# Patient Record
Sex: MALE | Race: WHITE | NOT HISPANIC OR LATINO | Employment: OTHER | URBAN - METROPOLITAN AREA
[De-identification: names, ages, dates, MRNs, and addresses within clinical notes are randomized per-mention and may not be internally consistent; named-entity substitution may affect disease eponyms.]

---

## 2017-01-30 DIAGNOSIS — G47.00 INSOMNIA: ICD-10-CM

## 2017-01-31 RX ORDER — TRAZODONE HYDROCHLORIDE 50 MG/1
150 TABLET ORAL NIGHTLY
Qty: 90 TABLET | Refills: 0 | Status: SHIPPED | OUTPATIENT
Start: 2017-01-31 | End: 2017-03-06 | Stop reason: SDUPTHER

## 2017-03-06 DIAGNOSIS — G47.00 INSOMNIA: ICD-10-CM

## 2017-03-07 RX ORDER — TRAZODONE HYDROCHLORIDE 50 MG/1
150 TABLET ORAL NIGHTLY
Qty: 90 TABLET | Refills: 0 | Status: SHIPPED | OUTPATIENT
Start: 2017-03-07 | End: 2017-04-04 | Stop reason: SDUPTHER

## 2017-04-04 ENCOUNTER — OFFICE VISIT (OUTPATIENT)
Dept: INTERNAL MEDICINE | Age: 62
End: 2017-04-04

## 2017-04-04 VITALS
HEIGHT: 71 IN | HEART RATE: 95 BPM | WEIGHT: 149 LBS | BODY MASS INDEX: 20.86 KG/M2 | OXYGEN SATURATION: 98 % | SYSTOLIC BLOOD PRESSURE: 114 MMHG | DIASTOLIC BLOOD PRESSURE: 70 MMHG | TEMPERATURE: 97.9 F

## 2017-04-04 DIAGNOSIS — F51.04 CHRONIC INSOMNIA: Primary | Chronic | ICD-10-CM

## 2017-04-04 DIAGNOSIS — G47.00 INSOMNIA, UNSPECIFIED TYPE: ICD-10-CM

## 2017-04-04 PROCEDURE — 99212 OFFICE O/P EST SF 10 MIN: CPT | Performed by: INTERNAL MEDICINE

## 2017-04-04 RX ORDER — MONTELUKAST SODIUM 10 MG/1
10 TABLET ORAL NIGHTLY
COMMUNITY
Start: 2017-03-27 | End: 2019-12-09 | Stop reason: SDUPTHER

## 2017-04-04 RX ORDER — TRAZODONE HYDROCHLORIDE 150 MG/1
150 TABLET ORAL NIGHTLY
Qty: 30 TABLET | Refills: 6 | Status: SHIPPED | OUTPATIENT
Start: 2017-04-04 | End: 2017-10-30 | Stop reason: SDUPTHER

## 2017-04-04 NOTE — PROGRESS NOTES
"Raymundo ADRIAN Vanessa / 61 y.o. / male  04/04/2017    VITALS    /70  Pulse 95  Temp 97.9 °F (36.6 °C)  Ht 71\" (180.3 cm)  Wt 149 lb (67.6 kg)  SpO2 98%  BMI 20.78 kg/m2  BP Readings from Last 3 Encounters:   04/04/17 114/70   07/27/16 110/60   07/05/16 115/74     Wt Readings from Last 3 Encounters:   04/04/17 149 lb (67.6 kg)   07/27/16 151 lb (68.5 kg)   07/05/16 151 lb 3.2 oz (68.6 kg)      Body mass index is 20.78 kg/(m^2).    CC:  Main reason(s) for today's visit: Insomnia      HPI:     H/o chronic insomnia stable on trazodone 150 mg nightly. Denies depression, increased anxiety. No problems w/ medication.     ____________________________________________________________________    ASSESSMENT & PLAN:    Problem List Items Addressed This Visit        High    Chronic insomnia - Primary (Chronic)    Current Assessment & Plan     Stable. Continue trazodone. Change to 150 mg tablet qhs.            Other Visit Diagnoses     Insomnia, unspecified type        Relevant Medications    traZODone (DESYREL) 150 MG tablet        No orders of the defined types were placed in this encounter.      Summary/Discussion:     ·       Return in about 3 months (around 7/4/2017) for Annual physical.    No future appointments.    ____________________________________________________________________    REVIEW OF SYSTEMS    Review of Systems  As noted per HPI  Constitutional neg   Other: As noted per HPI      PHYSICAL EXAMINATION    Physical Exam  Constitutional  No distress   Psychiatric  Alert. Judgment and thought content normal. Mood normal      REVIEWED DATA:    Labs:   No results found for: NA, K, AST, ALT, BUN, CREATININE, EGFRIFNONA, EGFRIFAFRI    No results found for: GLU, HGBA1C    No results found for: LDL, HDL, TRIG, CHOLHDLRATIO    No results found for: TSH, FREET4       Lab Results   Component Value Date    WBC 4.89 06/02/2016    HGB 12.6 (L) 06/02/2016    HGB 12.6 (L) 09/25/2015     06/02/2016        Imaging: "        Medical Tests:        Summary of old records / correspondence / consultant report:        Request outside records:          ALLERGIES:    Other    MEDICATIONS:  Current Outpatient Prescriptions   Medication Sig Dispense Refill   • montelukast (SINGULAIR) 10 MG tablet Take 10 mg by mouth Every Night.     • traZODone (DESYREL) 50 MG tablet Take 3 tablet by mouth Every Night. 30 tablet 6     No current facility-administered medications for this visit.        [facility]    The following portions of the patient's history were reviewed and updated as appropriate: Allergies / Current Medications / Past Medical History / Surgical History / Social History / Family History    PROBLEM LIST:    Patient Active Problem List   Diagnosis   • Chronic insomnia   • Hearing loss   • Inguinal pain   • Left inguinal hernia   • Tension headache   • Tinnitus       PAST MEDICAL HX:    Past Medical History:   Diagnosis Date   • Asthma    • Hernia, inguinal     RIGHT ING HERNIA       PAST SURGICAL HX:    Past Surgical History:   Procedure Laterality Date   • HERNIA REPAIR      LEFT SIDE   • HERNIA REPAIR  06/2016   • INGUINAL HERNIA REPAIR Right 6/6/2016    Procedure: RT INGUINAL HERNIA REPAIR WITH MESH;  Surgeon: Greg Alatorre Jr., MD;  Location: [facility];  Service:    • VASECTOMY         SOCIAL HX:    Social History     Social History   • Marital status:      Spouse name: N/A   • Number of children: N/A   • Years of education: N/A     Occupational History   • TEACHER      Social History Main Topics   • Smoking status: Former Smoker     Packs/day: 1.00     Years: 10.00     Types: Cigarettes     Quit date: 2006   • Smokeless tobacco: Current User     Types: Chew   • Alcohol use No   • Drug use: No   • Sexual activity: Not Asked     Other Topics Concern   • None     Social History Narrative       FAMILY HX:    Family History   Problem Relation Age of Onset   • Cancer Father      [place]         **Richard Disclaimer:   Much of  this encounter note is an electronic transcription/translation of spoken language to printed text. The electronic translation of spoken language may permit erroneous, or at times, nonsensical words or phrases to be inadvertently transcribed. Although I have reviewed the note for such errors, some may still exist.

## 2017-05-16 ENCOUNTER — TELEPHONE (OUTPATIENT)
Dept: INTERNAL MEDICINE | Age: 62
End: 2017-05-16

## 2017-05-16 ENCOUNTER — OFFICE VISIT (OUTPATIENT)
Dept: INTERNAL MEDICINE | Age: 62
End: 2017-05-16

## 2017-05-16 VITALS
TEMPERATURE: 98.1 F | WEIGHT: 150 LBS | HEIGHT: 71 IN | HEART RATE: 94 BPM | DIASTOLIC BLOOD PRESSURE: 66 MMHG | OXYGEN SATURATION: 98 % | SYSTOLIC BLOOD PRESSURE: 118 MMHG | BODY MASS INDEX: 21 KG/M2
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
## 2017-05-16 PROCEDURE — 99213 OFFICE O/P EST LOW 20 MIN: CPT | Performed by: INTERNAL MEDICINE

## 2017-05-16 RX ORDER — DOXYCYCLINE HYCLATE 100 MG/1
100 TABLET, DELAYED RELEASE ORAL 2 TIMES DAILY
Qty: 20 TABLET | Refills: 0 | Status: SHIPPED | OUTPATIENT
Start: 2017-05-16 | End: 2017-05-16

## 2017-05-16 RX ORDER — DOXYCYCLINE 100 MG/1
100 TABLET ORAL 2 TIMES DAILY
Qty: 20 TABLET | Refills: 0 | Status: SHIPPED | OUTPATIENT
Start: 2017-05-16 | End: 2017-05-26

## 2017-07-05 DIAGNOSIS — Z00.00 PREVENTATIVE HEALTH CARE: Primary | ICD-10-CM

## 2017-07-07 LAB
25(OH)D3+25(OH)D2 SERPL-MCNC: 29.9 NG/ML (ref 30–100)
ALBUMIN SERPL-MCNC: 3.8 G/DL (ref 3.5–5.2)
ALBUMIN/GLOB SERPL: 2 G/DL
ALP SERPL-CCNC: 62 U/L (ref 39–117)
ALT SERPL-CCNC: 7 U/L (ref 1–41)
APPEARANCE UR: CLEAR
AST SERPL-CCNC: 11 U/L (ref 1–40)
BASOPHILS # BLD AUTO: 0.07 10*3/MM3 (ref 0–0.2)
BASOPHILS NFR BLD AUTO: 1.8 % (ref 0–1.5)
BILIRUB SERPL-MCNC: 0.4 MG/DL (ref 0.1–1.2)
BILIRUB UR QL STRIP: NEGATIVE
BUN SERPL-MCNC: 13 MG/DL (ref 8–23)
BUN/CREAT SERPL: 10.7 (ref 7–25)
CALCIUM SERPL-MCNC: 8.8 MG/DL (ref 8.6–10.5)
CHLORIDE SERPL-SCNC: 105 MMOL/L (ref 98–107)
CHOLEST SERPL-MCNC: 223 MG/DL (ref 0–200)
CHOLEST/HDLC SERPL: 4.46 {RATIO}
CO2 SERPL-SCNC: 28.4 MMOL/L (ref 22–29)
COLOR UR: YELLOW
CONV COMMENT: NORMAL
CREAT SERPL-MCNC: 1.21 MG/DL (ref 0.76–1.27)
EOSINOPHIL # BLD AUTO: 0.07 10*3/MM3 (ref 0–0.7)
EOSINOPHIL NFR BLD AUTO: 1.8 % (ref 0.3–6.2)
ERYTHROCYTE [DISTWIDTH] IN BLOOD BY AUTOMATED COUNT: 13.4 % (ref 11.5–14.5)
GLOBULIN SER CALC-MCNC: 1.9 GM/DL
GLUCOSE SERPL-MCNC: 100 MG/DL (ref 65–99)
GLUCOSE UR QL: NEGATIVE
HBA1C MFR BLD: 5.58 % (ref 4.8–5.6)
HCT VFR BLD AUTO: 40.3 % (ref 40.4–52.2)
HCV AB S/CO SERPL IA: <0.1 S/CO RATIO (ref 0–0.9)
HDLC SERPL-MCNC: 50 MG/DL (ref 40–60)
HGB BLD-MCNC: 13.4 G/DL (ref 13.7–17.6)
HGB UR QL STRIP: NEGATIVE
IMM GRANULOCYTES # BLD: 0 10*3/MM3 (ref 0–0.03)
IMM GRANULOCYTES NFR BLD: 0 % (ref 0–0.5)
KETONES UR QL STRIP: NEGATIVE
LDLC SERPL CALC-MCNC: 158 MG/DL (ref 0–100)
LEUKOCYTE ESTERASE UR QL STRIP: NEGATIVE
LYMPHOCYTES # BLD AUTO: 1.53 10*3/MM3 (ref 0.9–4.8)
LYMPHOCYTES NFR BLD AUTO: 38.8 % (ref 19.6–45.3)
MCH RBC QN AUTO: 30.5 PG (ref 27–32.7)
MCHC RBC AUTO-ENTMCNC: 33.3 G/DL (ref 32.6–36.4)
MCV RBC AUTO: 91.6 FL (ref 79.8–96.2)
MONOCYTES # BLD AUTO: 0.29 10*3/MM3 (ref 0.2–1.2)
MONOCYTES NFR BLD AUTO: 7.4 % (ref 5–12)
NEUTROPHILS # BLD AUTO: 1.98 10*3/MM3 (ref 1.9–8.1)
NEUTROPHILS NFR BLD AUTO: 50.2 % (ref 42.7–76)
NITRITE UR QL STRIP: NEGATIVE
PH UR STRIP: 6.5 [PH] (ref 5–8)
PLATELET # BLD AUTO: 203 10*3/MM3 (ref 140–500)
POTASSIUM SERPL-SCNC: 4.3 MMOL/L (ref 3.5–5.2)
PROT SERPL-MCNC: 5.7 G/DL (ref 6–8.5)
PROT UR QL STRIP: NEGATIVE
PSA SERPL-MCNC: 0.54 NG/ML (ref 0–4)
RBC # BLD AUTO: 4.4 10*6/MM3 (ref 4.6–6)
SODIUM SERPL-SCNC: 142 MMOL/L (ref 136–145)
SP GR UR: 1.02 (ref 1–1.03)
T4 FREE SERPL-MCNC: 1.19 NG/DL (ref 0.93–1.7)
TESTOST SERPL-MCNC: 697 NG/DL (ref 348–1197)
TRIGL SERPL-MCNC: 76 MG/DL (ref 0–150)
TSH SERPL DL<=0.005 MIU/L-ACNC: 1.49 MIU/ML (ref 0.27–4.2)
UROBILINOGEN UR STRIP-MCNC: NORMAL MG/DL
VLDLC SERPL CALC-MCNC: 15.2 MG/DL (ref 5–40)
WBC # BLD AUTO: 3.94 10*3/MM3 (ref 4.5–10.7)

## 2017-07-13 ENCOUNTER — OFFICE VISIT (OUTPATIENT)
Dept: INTERNAL MEDICINE | Age: 62
End: 2017-07-13

## 2017-07-13 VITALS
OXYGEN SATURATION: 100 % | TEMPERATURE: 97.9 F | BODY MASS INDEX: 21 KG/M2 | WEIGHT: 150 LBS | HEART RATE: 91 BPM | HEIGHT: 71 IN | DIASTOLIC BLOOD PRESSURE: 60 MMHG | SYSTOLIC BLOOD PRESSURE: 110 MMHG

## 2017-07-13 DIAGNOSIS — F17.229 CHEWING TOBACCO NICOTINE DEPENDENCE WITH NICOTINE-INDUCED DISORDER: Chronic | ICD-10-CM

## 2017-07-13 DIAGNOSIS — Z12.11 SCREENING FOR COLON CANCER: ICD-10-CM

## 2017-07-13 DIAGNOSIS — E78.00 PURE HYPERCHOLESTEROLEMIA: ICD-10-CM

## 2017-07-13 DIAGNOSIS — Z00.00 ENCOUNTER FOR ANNUAL HEALTH EXAMINATION: Primary | ICD-10-CM

## 2017-07-13 PROCEDURE — 99213 OFFICE O/P EST LOW 20 MIN: CPT | Performed by: INTERNAL MEDICINE

## 2017-07-13 PROCEDURE — 93000 ELECTROCARDIOGRAM COMPLETE: CPT | Performed by: INTERNAL MEDICINE

## 2017-07-13 PROCEDURE — 99396 PREV VISIT EST AGE 40-64: CPT | Performed by: INTERNAL MEDICINE

## 2017-07-13 RX ORDER — ASPIRIN 81 MG/1
81 TABLET ORAL DAILY
COMMUNITY
Start: 2017-07-13 | End: 2019-03-22

## 2017-07-13 RX ORDER — ATORVASTATIN CALCIUM 20 MG/1
20 TABLET, FILM COATED ORAL DAILY
Qty: 30 TABLET | Refills: 3 | Status: SHIPPED | OUTPATIENT
Start: 2017-07-13 | End: 2019-03-22

## 2017-07-13 NOTE — ASSESSMENT & PLAN NOTE
Start atorvastatin 20 mg daily along w/ ECASA 81 mg qd. Maintain low fat/cholesterol diet.  Schedule for fasting labs in 3 months.

## 2017-07-13 NOTE — PROGRESS NOTES
Raymundo Mendezen / 62 y.o. / male  07/13/2017    CC: Main reason(s) for today's visit: Annual Exam      HPI:      Raymundo presents for annual health maintenance visit.  Uses chewing tobacco. Prior smoking hx.  Family h/o stroke, heart attack.  Personal h/o hyperlipidemia but never took medication.    · Last health maintenance visit: 1 year ago  · General health: good  · Lifestyle:  · Attempting to lose weight?: No   · Diet: could be better  · Exercise: could be better  · Tobacco: Uses smokeless nicotine   · Alcohol: recovering alocholic  · Work: Full-time  · Reproductive health:  · Sexually active?: Yes   · Concern for STD?: No   · Sexual problems?: No problems   · Sees Urologist?:No   · Depression Screening:      PHQ-9 Depression Screening 7/13/2017   Little interest or pleasure in doing things 0   Feeling down, depressed, or hopeless 0   PHQ-9 Total Score 0         PHQ-2: 0 (Not depressed)     PHQ-9:     Patient Care Team:  Alin Villegas MD as PCP - General  Alin Villegas MD as PCP - Family Medicine  Waqas Velarde MD as Consulting Physician (Allergy)  ______________________________________________________________________    No Known Allergies    Current Outpatient Prescriptions   Medication Sig Dispense Refill   • montelukast (SINGULAIR) 10 MG tablet Take 10 mg by mouth Every Night.     • traZODone (DESYREL) 150 MG tablet Take 1 tablet by mouth Every Night. 30 tablet 6       PFSH:     The following portions of the patient's history were reviewed and updated as appropriate: Allergies / Current Medications / Past Medical History / Surgical History / Social History / Family History    Patient Active Problem List   Diagnosis   • Chronic insomnia   • Tinnitus   • Pure hypercholesterolemia   • Chewing tobacco nicotine dependence with nicotine-induced disorder       Past Medical History:   Diagnosis Date   • Asthma    • History of skin cancer     left anterior chest   • Shingles 20's       Past Surgical History:   Procedure  Laterality Date   • HERNIA REPAIR      LEFT SIDE   • HERNIA REPAIR  2016   • INGUINAL HERNIA REPAIR Right 2016    Procedure: RT INGUINAL HERNIA REPAIR WITH MESH;  Surgeon: Greg Alatorre Jr., MD;  Location: [facility] OR;  Service:    • VASECTOMY         Social History     Social History   • Marital status:      Spouse name: Brianna*   • Number of children: 3   • Years of education: N/A     Occupational History   • 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)      Social History Main Topics   • Smoking status: Former Smoker     Packs/day: 1.00     Years: 10.00     Types: Cigarettes     Quit date:    • Smokeless tobacco: Current User     Types: Snuff      Comment: advised to quit smokeless tobacco   • Alcohol use No      Comment: 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 ~   • Drug use: No   • Sexual activity: Yes     Partners: Female     Other Topics Concern   • None     Social History Narrative       Family History   Problem Relation Age of Onset   • Alzheimer's disease Father 80      90   • Lymphoma Father    • Other Mother      borderline   • Stroke Brother    • Hypertension Brother    • Diabetes Maternal Grandmother    • Heart attack Maternal Grandfather 60   • Cancer Paternal Grandmother      another cancer   • Breast cancer Paternal Grandmother    • Cancer Paternal Grandfather        Immunization History   Administered Date(s) Administered   • Tdap 2014       ______________________________________________________________________    REVIEW OF SYSTEMS    Review of Systems   Constitutional: Negative.    HENT: Negative.    Eyes: Negative.    Respiratory: Negative.    Cardiovascular: Negative.    Gastrointestinal: Negative.    Endocrine: Negative.    Genitourinary: Negative.    Musculoskeletal: Negative.    Skin: Negative.    Allergic/Immunologic: Negative.    Neurological: Negative.    Hematological: Negative.    Psychiatric/Behavioral: Positive for sleep disturbance.       VITALS:    /60  Pulse  "91  Temp 97.9 °F (36.6 °C)  Ht 71\" (180.3 cm)  Wt 150 lb (68 kg)  SpO2 100%  BMI 20.92 kg/m2  BP Readings from Last 3 Encounters:   07/13/17 110/60   05/16/17 118/66   04/04/17 114/70     Wt Readings from Last 3 Encounters:   07/13/17 150 lb (68 kg)   05/16/17 150 lb (68 kg)   04/04/17 149 lb (67.6 kg)      Body mass index is 20.92 kg/(m^2).    PHYSICAL EXAMINATION    Physical Exam   Constitutional: He is oriented to person, place, and time. He appears well-nourished. No distress.   HENT:   Head: Normocephalic.   Right Ear: External ear normal.   Left Ear: External ear normal.   Nose: Nose normal.   Mouth/Throat: Oropharynx is clear and moist.   Eyes: Conjunctivae and EOM are normal. Pupils are equal, round, and reactive to light. No scleral icterus.   Neck: Normal range of motion. Neck supple. No tracheal deviation present. No thyromegaly present.   Cardiovascular: Normal rate, regular rhythm, normal heart sounds and intact distal pulses.  Exam reveals no gallop and no friction rub.    No murmur heard.  Pulmonary/Chest: Effort normal and breath sounds normal.   Abdominal: Soft. Bowel sounds are normal. He exhibits no distension and no mass. There is no tenderness. No hernia.   Genitourinary: Rectum normal, prostate normal, testes normal and penis normal.   Musculoskeletal: Normal range of motion. He exhibits no edema or deformity.   Lymphadenopathy:     He has no cervical adenopathy.     He has no axillary adenopathy.        Right: No inguinal and no supraclavicular adenopathy present.        Left: No inguinal and no supraclavicular adenopathy present.   Neurological: He is alert and oriented to person, place, and time. He has normal reflexes. He displays normal reflexes. No cranial nerve deficit. He exhibits normal muscle tone. Coordination normal.   Skin: Skin is warm and intact. No rash noted. He is not diaphoretic. No cyanosis or erythema. No pallor. Nails show no clubbing.   Psychiatric: He has a normal " mood and affect. His behavior is normal. Judgment and thought content normal. Cognition and memory are normal.       REVIEWED DATA:    Lab Results   Component Value Date     07/06/2017    K 4.3 07/06/2017    AST 11 07/06/2017    ALT 7 07/06/2017    BUN 13 07/06/2017    CREATININE 1.21 07/06/2017    EGFRIFNONA 61 07/06/2017    EGFRIFAFRI 74 07/06/2017       Lab Results   Component Value Date     (H) 07/06/2017    HGBA1C 5.58 07/06/2017    TSH 1.490 07/06/2017    FREET4 1.19 07/06/2017       Lab Results   Component Value Date    PSA 0.544 07/06/2017    TESTOSTEROTT 697 07/06/2017       Lab Results   Component Value Date     (H) 07/06/2017    HDL 50 07/06/2017    TRIG 76 07/06/2017    CHOLHDLRATIO 4.46 07/06/2017       No components found for: [id number]    Lab Results   Component Value Date    WBC 3.94 (L) 07/06/2017    HGB 13.4 (L) 07/06/2017    MCV 91.6 07/06/2017     07/06/2017       Lab Results   Component Value Date    PROTEIN Negative 07/06/2017    GLUCOSEU Negative 07/06/2017    BLOODU Negative 07/06/2017    NITRITEU Negative 07/06/2017    LEUKOCYTESUR Negative 07/06/2017          Lab Results   Component Value Date    HEPCVIRUSABY <0.1 07/06/2017       ______________________________________________________________________    ASSESSMENT & PLAN    ANNUAL WELLNESS EXAM / PHYSICAL     Other medical problems addressed today:  Problem List Items Addressed This Visit     Pure hypercholesterolemia (Chronic)    Current Assessment & Plan     Start atorvastatin 20 mg daily along w/ ECASA 81 mg qd. Maintain low fat/cholesterol diet.  Schedule for fasting labs in 3 months.          Relevant Medications    aspirin 81 MG EC tablet    atorvastatin (LIPITOR) 20 MG tablet    Other Relevant Orders    Hepatic Function Panel    Lipid Panel With / Chol / HDL Ratio    Chewing tobacco nicotine dependence with nicotine-induced disorder (Chronic)    Current Assessment & Plan     Counseled on cessation. Try  nicotine gum.   Recommended seeing dentist regularly for exam.         Relevant Medications    traZODone (DESYREL) 150 MG tablet      Other Visit Diagnoses     Encounter for annual health examination    -  Primary    Screening for colon cancer        Relevant Orders    Cologuard          Summary/Discussion:     · Primary reason for today's visit was for annual health examination. However above active medical issues also needed to be addressed today.        Return in about 6 months (around 1/13/2018) for F/U chronic medical problems, Hyperlipidemia.    No future appointments.      HEALTHCARE MAINTENANCE ISSUES:    Cancer Screening:  · Colon: Initial/Next screening at age: PATIENT DEFERS AT THIS TIME (ordered Cologuard)  · Repeat colonoscopy N/A at this time  · Prostate: Performed today and recommended annual screening  · Testicular: Recommended monthly self exam  · Skin: Monthly self skin examination, annual exam by health professional  · Lung:   · Other: Oral cancer screening w/ dentist annually    Screening Labs & Tests:  · Lab results reviewed & discussed with with patient or orders placed today.  EKG:EKG Performed today and reviewed results with patient.  · Ekg: normal EKG, normal sinus rhythm, there are no previous tracings available for comparison   · Vascular Screening:   · DEXA (75+ or risk factors):   · HEP C (If born [id number] or risk factors): Negative screen    Immunization/Vaccinations (to be given today unless deferred by patient)  · Tetanus/Pertussis: Up to date  · Pneumovax: Not needed at this time  · Prevnar 13: Not needed at this time  · Zostavax: Recommended at outside pharmacy  · Influenza: Recommended annual flu shot  · Other:     Lifestyle Counseling:  · Lifestyle Modifications: Follow a low fat, low cholesterol diet and quit chewing tobacco  · Safety Issues: Always wear seatbelt, Avoid texting while driving   · Use sunscreen, regular skin examination  · Recommended annual dental/vision  examination.  · Emotional/Stress/Sleep: Reviewed and  given when appropriate      Health Maintenance   Topic Date Due   • INFLUENZA VACCINE  08/01/2017   • PROSTATE CANCER SCREENING  07/06/2018   • LIPID PANEL  07/06/2018   • TDAP/TD VACCINES (2 - Td) 06/23/2024   • COLONOSCOPY  07/13/2027   • HEPATITIS C SCREENING  Completed   • ZOSTER VACCINE  Addressed         **Richard Disclaimer:   Much of this encounter note is an electronic transcription/translation of spoken language to printed text. The electronic translation of spoken language may permit erroneous, or at times, nonsensical words or phrases to be inadvertently transcribed. Although I have reviewed the note for such errors, some may still exist.

## 2017-10-11 ENCOUNTER — RESULTS ENCOUNTER (OUTPATIENT)
Dept: INTERNAL MEDICINE | Age: 62
End: 2017-10-11

## 2017-10-11 DIAGNOSIS — E78.00 PURE HYPERCHOLESTEROLEMIA: ICD-10-CM

## 2017-10-30 DIAGNOSIS — G47.00 INSOMNIA, UNSPECIFIED TYPE: ICD-10-CM

## 2017-10-31 RX ORDER — TRAZODONE HYDROCHLORIDE 150 MG/1
TABLET ORAL
Qty: 30 TABLET | Refills: 3 | Status: SHIPPED | OUTPATIENT
Start: 2017-10-31 | End: 2018-02-28 | Stop reason: SDUPTHER

## 2018-02-06 ENCOUNTER — OFFICE VISIT (OUTPATIENT)
Dept: INTERNAL MEDICINE | Age: 63
End: 2018-02-06

## 2018-02-06 VITALS
TEMPERATURE: 97.7 F | HEART RATE: 89 BPM | DIASTOLIC BLOOD PRESSURE: 70 MMHG | HEIGHT: 71 IN | WEIGHT: 149.2 LBS | OXYGEN SATURATION: 100 % | BODY MASS INDEX: 20.89 KG/M2 | SYSTOLIC BLOOD PRESSURE: 118 MMHG

## 2018-02-06 DIAGNOSIS — R19.7 ACUTE DIARRHEA: Primary | ICD-10-CM

## 2018-02-06 DIAGNOSIS — Z12.11 SCREENING FOR COLON CANCER: ICD-10-CM

## 2018-02-06 DIAGNOSIS — K52.9 ACUTE GASTROENTERITIS: ICD-10-CM

## 2018-02-06 LAB
ALBUMIN SERPL-MCNC: 4.2 G/DL (ref 3.5–5.2)
ALBUMIN/GLOB SERPL: 2.1 G/DL
ALP SERPL-CCNC: 67 U/L (ref 39–117)
ALT SERPL-CCNC: 12 U/L (ref 1–41)
APPEARANCE UR: CLEAR
AST SERPL-CCNC: 13 U/L (ref 1–40)
BASOPHILS # BLD AUTO: 0.04 10*3/MM3 (ref 0–0.2)
BASOPHILS NFR BLD AUTO: 1 % (ref 0–1.5)
BILIRUB SERPL-MCNC: 0.2 MG/DL (ref 0.1–1.2)
BILIRUB UR QL STRIP: NEGATIVE
BUN SERPL-MCNC: 14 MG/DL (ref 8–23)
BUN/CREAT SERPL: 14.4 (ref 7–25)
CALCIUM SERPL-MCNC: 8.8 MG/DL (ref 8.6–10.5)
CHLORIDE SERPL-SCNC: 102 MMOL/L (ref 98–107)
CO2 SERPL-SCNC: 28 MMOL/L (ref 22–29)
COLOR UR: YELLOW
CREAT SERPL-MCNC: 0.97 MG/DL (ref 0.76–1.27)
EOSINOPHIL # BLD AUTO: 0.04 10*3/MM3 (ref 0–0.7)
EOSINOPHIL NFR BLD AUTO: 1 % (ref 0.3–6.2)
ERYTHROCYTE [DISTWIDTH] IN BLOOD BY AUTOMATED COUNT: 13.4 % (ref 11.5–14.5)
GFR SERPLBLD CREATININE-BSD FMLA CKD-EPI: 79 ML/MIN/1.73
GFR SERPLBLD CREATININE-BSD FMLA CKD-EPI: 95 ML/MIN/1.73
GLOBULIN SER CALC-MCNC: 2 GM/DL
GLUCOSE SERPL-MCNC: 76 MG/DL (ref 65–99)
GLUCOSE UR QL: NEGATIVE
HCT VFR BLD AUTO: 41.6 % (ref 40.4–52.2)
HGB BLD-MCNC: 13.4 G/DL (ref 13.7–17.6)
HGB UR QL STRIP: NEGATIVE
IMM GRANULOCYTES # BLD: 0 10*3/MM3 (ref 0–0.03)
IMM GRANULOCYTES NFR BLD: 0 % (ref 0–0.5)
KETONES UR QL STRIP: NEGATIVE
LEUKOCYTE ESTERASE UR QL STRIP: NEGATIVE
LYMPHOCYTES # BLD AUTO: 1.45 10*3/MM3 (ref 0.9–4.8)
LYMPHOCYTES NFR BLD AUTO: 35.8 % (ref 19.6–45.3)
MCH RBC QN AUTO: 30.6 PG (ref 27–32.7)
MCHC RBC AUTO-ENTMCNC: 32.2 G/DL (ref 32.6–36.4)
MCV RBC AUTO: 95 FL (ref 79.8–96.2)
MONOCYTES # BLD AUTO: 0.34 10*3/MM3 (ref 0.2–1.2)
MONOCYTES NFR BLD AUTO: 8.4 % (ref 5–12)
NEUTROPHILS # BLD AUTO: 2.18 10*3/MM3 (ref 1.9–8.1)
NEUTROPHILS NFR BLD AUTO: 53.8 % (ref 42.7–76)
NITRITE UR QL STRIP: NEGATIVE
PH UR STRIP: 5.5 [PH] (ref 5–8)
PLATELET # BLD AUTO: 190 10*3/MM3 (ref 140–500)
POTASSIUM SERPL-SCNC: 4.4 MMOL/L (ref 3.5–5.2)
PROT SERPL-MCNC: 6.2 G/DL (ref 6–8.5)
PROT UR QL STRIP: NEGATIVE
RBC # BLD AUTO: 4.38 10*6/MM3 (ref 4.6–6)
SODIUM SERPL-SCNC: 140 MMOL/L (ref 136–145)
SP GR UR: 1.02 (ref 1–1.03)
UROBILINOGEN UR STRIP-MCNC: NORMAL MG/DL
WBC # BLD AUTO: 4.05 10*3/MM3 (ref 4.5–10.7)

## 2018-02-06 PROCEDURE — 99213 OFFICE O/P EST LOW 20 MIN: CPT | Performed by: NURSE PRACTITIONER

## 2018-02-06 NOTE — PROGRESS NOTES
Subjective   Raymundo Mendez is a 62 y.o. male.     Diarrhea    This is a new problem. Episode onset: 4-5 days ago. Episode frequency: 2-3 times on Thursday, improved since then. The problem has been gradually improving. The stool consistency is described as watery. Associated symptoms include abdominal pain (cramping, still having but improved, generalized). Pertinent negatives include no arthralgias, chills, coughing, fever, myalgias or vomiting. Nothing aggravates the symptoms. Risk factors include ill contacts (works as ). Treatments tried: Pepto-Bismol. The treatment provided moderate relief. There is no history of bowel resection, inflammatory bowel disease, irritable bowel syndrome, malabsorption, a recent abdominal surgery or short gut syndrome.      Patient reports diarrhea was normal in color at onset, started taking Pepto Bismol and has noticed black stools since then. Colonoscopy is not UTD, Cologuard ordered at last visit but patient never received.     The following portions of the patient's history were reviewed and updated as appropriate: allergies, current medications, past family history, past medical history, past social history, past surgical history and problem list.    Review of Systems   Constitutional: Positive for fatigue. Negative for chills and fever.   HENT: Negative for congestion.    Respiratory: Negative for cough.    Cardiovascular: Negative for chest pain.   Gastrointestinal: Positive for abdominal pain (cramping, still having but improved, generalized) and diarrhea. Negative for abdominal distention, blood in stool, constipation, nausea, rectal pain and vomiting.   Genitourinary: Negative for dysuria, flank pain and hematuria.   Musculoskeletal: Negative for arthralgias and myalgias.       Objective   Physical Exam   Constitutional: Vital signs are normal. He appears well-developed and well-nourished. He is cooperative. He does not appear ill. No distress.    Cardiovascular: Normal rate, regular rhythm, S1 normal, S2 normal and normal heart sounds.    No murmur heard.  Pulmonary/Chest: Effort normal and breath sounds normal.   Abdominal: Soft. Normal appearance. He exhibits no distension and no mass. Bowel sounds are increased. There is no hepatosplenomegaly. There is no tenderness. There is no rebound, no CVA tenderness, no tenderness at McBurney's point and negative Collier's sign.   Neurological: He is alert. No cranial nerve deficit or sensory deficit.   Skin: Skin is warm, dry and intact.   Psychiatric: He has a normal mood and affect. His speech is normal and behavior is normal. Judgment and thought content normal. Cognition and memory are normal.   Nursing note and vitals reviewed.      Assessment/Plan   Problems Addressed this Visit     None      Visit Diagnoses     Acute diarrhea    -  Primary    Relevant Orders    CBC & Differential    Comprehensive Metabolic Panel    Cologuard - Stool, Per Rectum    Urinalysis With / Microscopic If Indicated - Urine, Clean Catch    Acute gastroenteritis        Relevant Orders    CBC & Differential    Comprehensive Metabolic Panel    Screening for colon cancer        Relevant Orders    Cologuard - Stool, Per Rectum        1. Acute diarrhea  Patient with acute diarrhea and abdominal cramping times approximately 4-5 days, significant improvement in the last couple of days.  I suspect this is likely an acute gastroenteritis, will obtain labs today to ensure no elevated white count, significant bleeding, or electrolyte abnormalities related to excessive water loss or diarrhea.  Discussed with patient I would continue conservative treatment, including bland diet and increase by mouth fluids until feeling better.  He does not have any abdominal pain today, do not suspect acute abdominal process.  Black stools noted by patient are most likely cause from Pepto-Bismol that he has taken, will check CBC today to ensure no significant  bleeding.  Patient is not up-to-date on colon cancer screening, will reorder Cologuard testing today. Follow up if no improvement in symptoms or if diarrhea returns.     - CBC & Differential  - Comprehensive Metabolic Panel  - Cologuard - Stool, Per Rectum  - Urinalysis With / Microscopic If Indicated - Urine, Clean Catch    2. Acute gastroenteritis    - CBC & Differential  - Comprehensive Metabolic Panel    3. Screening for colon cancer    - Cologuard - Stool, Per Rectum

## 2018-02-06 NOTE — PATIENT INSTRUCTIONS
Diarrhea, Adult    Diarrhea is frequent loose and watery bowel movements. Diarrhea can make you feel weak and cause you to become dehydrated. Dehydration can make you tired and thirsty, cause you to have a dry mouth, and decrease how often you urinate. Diarrhea typically lasts 2-3 days. However, it can last longer if it is a sign of something more serious. It is important to treat your diarrhea as told by your health care provider.  Follow these instructions at home:  Eating and drinking  Follow these recommendations as told by your health care provider:  · Take an oral rehydration solution (ORS). This is a drink that is sold at pharmacies and retail stores.  · Drink clear fluids, such as water, ice chips, diluted fruit juice, and low-calorie sports drinks.  · Eat bland, easy-to-digest foods in small amounts as you are able. These foods include bananas, applesauce, rice, lean meats, toast, and crackers.  · Avoid drinking fluids that contain a lot of sugar or caffeine, such as energy drinks, sports drinks, and soda.  · Avoid alcohol.  · Avoid spicy or fatty foods.  General instructions  · Drink enough fluid to keep your urine clear or pale yellow.  · Wash your hands often. If soap and water are not available, use hand .  · Make sure that all people in your household wash their hands well and often.  · Take over-the-counter and prescription medicines only as told by your health care provider.  · Rest at home while you recover.  · Watch your condition for any changes.  · Take a warm bath to relieve any burning or pain from frequent diarrhea episodes.  · Keep all follow-up visits as told by your health care provider. This is important.  Contact a health care provider if:  · You have a fever.  · Your diarrhea gets worse.  · You have new symptoms.  · You cannot keep fluids down.  · You feel light-headed or dizzy.  · You have a headache  · You have muscle cramps.  Get help right away if:  · You have chest  pain.  · You feel extremely weak or you faint.  · You have bloody or black stools or stools that look like tar.  · You have severe pain, cramping, or bloating in your abdomen.  · You have trouble breathing or you are breathing very quickly.  · Your heart is beating very quickly.  · Your skin feels cold and clammy.  · You feel confused.  · You have signs of dehydration, such as:  ¨ Dark urine, very little urine, or no urine.  ¨ Cracked lips.  ¨ Dry mouth.  ¨ Sunken eyes.  ¨ Sleepiness.  ¨ Weakness.  This information is not intended to replace advice given to you by your health care provider. Make sure you discuss any questions you have with your health care provider.  Document Released: 12/08/2003 Document Revised: 04/27/2017 Document Reviewed: 08/23/2016  [employer] Interactive Patient Education © 2017 [employer]

## 2018-02-07 ENCOUNTER — TELEPHONE (OUTPATIENT)
Dept: INTERNAL MEDICINE | Age: 63
End: 2018-02-07

## 2018-02-08 ENCOUNTER — TELEPHONE (OUTPATIENT)
Dept: INTERNAL MEDICINE | Age: 63
End: 2018-02-08

## 2018-02-08 NOTE — TELEPHONE ENCOUNTER
----- Message from JULIO C Phipps sent at 2/7/2018  7:31 AM EST -----  Please call patient with results and send result letter to home address.    Mr. Mendez:     Here are the results of your labs from your visit.  Your CBC is non-remarkable, no sign of anemia, platelet disorder, or obvious infection. WBC count and hemoglobin are stable.   Your CMP labs are all normal. This is a measure of kidney function, electrolytes, and liver function. Fasting blood sugar is at an acceptable level.   Your urinalysis is normal, no sign of infection, blood, or protein in the urine.  No significant dehydration noted.  These values are most consistent with acute gastroenteritis, as we discussed. Continue conservative treatment as we discussed and follow up as needed if no improvement.     Neyda BUNCH

## 2018-02-08 NOTE — TELEPHONE ENCOUNTER
Attempted to call pt re: lab results.    Pt's phone does not have VM set up.    Will attempt to call pt at a later time.    KD

## 2018-02-08 NOTE — TELEPHONE ENCOUNTER
Pt returned call.    Pt was informed of all normal lab results.    Pt was advised to continue conservative Tx, and f/u PRN.    Pt demonstrated understanding.    KD

## 2018-02-28 DIAGNOSIS — G47.00 INSOMNIA, UNSPECIFIED TYPE: ICD-10-CM

## 2018-02-28 RX ORDER — TRAZODONE HYDROCHLORIDE 150 MG/1
TABLET ORAL
Qty: 30 TABLET | Refills: 1 | Status: SHIPPED | OUTPATIENT
Start: 2018-02-28 | End: 2018-04-24 | Stop reason: SDUPTHER

## 2018-04-24 DIAGNOSIS — G47.00 INSOMNIA, UNSPECIFIED TYPE: ICD-10-CM

## 2018-04-24 RX ORDER — TRAZODONE HYDROCHLORIDE 150 MG/1
TABLET ORAL
Qty: 30 TABLET | Refills: 0 | Status: SHIPPED | OUTPATIENT
Start: 2018-04-24 | End: 2018-05-30 | Stop reason: SDUPTHER

## 2018-05-30 DIAGNOSIS — G47.00 INSOMNIA, UNSPECIFIED TYPE: ICD-10-CM

## 2018-05-31 RX ORDER — TRAZODONE HYDROCHLORIDE 150 MG/1
TABLET ORAL
Qty: 30 TABLET | Refills: 0 | Status: SHIPPED | OUTPATIENT
Start: 2018-05-31 | End: 2018-06-28 | Stop reason: SDUPTHER

## 2018-06-28 DIAGNOSIS — G47.00 INSOMNIA, UNSPECIFIED TYPE: ICD-10-CM

## 2018-06-28 RX ORDER — TRAZODONE HYDROCHLORIDE 150 MG/1
TABLET ORAL
Qty: 30 TABLET | Refills: 0 | Status: SHIPPED | OUTPATIENT
Start: 2018-06-28 | End: 2018-07-27 | Stop reason: SDUPTHER

## 2018-07-27 DIAGNOSIS — G47.00 INSOMNIA, UNSPECIFIED TYPE: ICD-10-CM

## 2018-07-27 RX ORDER — TRAZODONE HYDROCHLORIDE 150 MG/1
TABLET ORAL
Qty: 30 TABLET | Refills: 0 | Status: SHIPPED | OUTPATIENT
Start: 2018-07-27 | End: 2018-08-06 | Stop reason: SDUPTHER

## 2018-08-06 ENCOUNTER — OFFICE VISIT (OUTPATIENT)
Dept: INTERNAL MEDICINE | Age: 63
End: 2018-08-06

## 2018-08-06 VITALS
HEART RATE: 88 BPM | TEMPERATURE: 99.1 F | OXYGEN SATURATION: 98 % | HEIGHT: 71 IN | WEIGHT: 151 LBS | DIASTOLIC BLOOD PRESSURE: 66 MMHG | SYSTOLIC BLOOD PRESSURE: 120 MMHG | BODY MASS INDEX: 21.14 KG/M2

## 2018-08-06 DIAGNOSIS — G47.00 INSOMNIA, UNSPECIFIED TYPE: Primary | ICD-10-CM

## 2018-08-06 PROCEDURE — 99212 OFFICE O/P EST SF 10 MIN: CPT | Performed by: INTERNAL MEDICINE

## 2018-08-06 RX ORDER — TRAZODONE HYDROCHLORIDE 150 MG/1
150 TABLET ORAL
Qty: 30 TABLET | Refills: 5 | Status: SHIPPED | OUTPATIENT
Start: 2018-08-06 | End: 2019-02-20 | Stop reason: SDUPTHER

## 2018-08-06 NOTE — PROGRESS NOTES
"[facility] [place] INTERNAL MEDICINE  WALDEMAR PADRON M.D.      Raymundo Mendez / 63 y.o. / male  08/06/2018      ASSESSMENT & PLAN:    Problem List Items Addressed This Visit        High    Insomnia - Primary (Chronic)    Overview     Stable. Continue trazodone 150 mg qHS.          Current Assessment & Plan     Refills sent to pharmacy.          Relevant Medications    traZODone (DESYREL) 150 MG tablet        No orders of the defined types were placed in this encounter.    New Medications Ordered This Visit   Medications   • traZODone (DESYREL) 150 MG tablet     Sig: Take 1 tablet by mouth every night at bedtime.     Dispense:  30 tablet     Refill:  5     PT NEEDS APPT FOR FURTHER REFILLS       Summary/Discussion:      No Follow-up on file.    ____________________________________________________________________    MEDICATIONS  Current Outpatient Prescriptions   Medication Sig Dispense Refill   • montelukast (SINGULAIR) 10 MG tablet Take 10 mg by mouth Every Night.     • traZODone (DESYREL) 150 MG tablet Take 1 tablet by mouth every night at bedtime. 30 tablet 5   • aspirin 81 MG EC tablet Take 1 tablet by mouth Daily.     • atorvastatin (LIPITOR) 20 MG tablet Take 1 tablet by mouth Daily. 30 tablet 3     No current facility-administered medications for this visit.          VITALS    Visit Vitals  /66   Pulse 88   Temp 99.1 °F (37.3 °C)   Ht 180.3 cm (70.98\")   Wt 68.5 kg (151 lb)   SpO2 98%   BMI 21.07 kg/m²       BP Readings from Last 3 Encounters:   08/06/18 120/66   02/06/18 118/70   07/13/17 110/60     Wt Readings from Last 3 Encounters:   08/06/18 68.5 kg (151 lb)   02/06/18 67.7 kg (149 lb 3.2 oz)   07/13/17 68 kg (150 lb)      Body mass index is 21.07 kg/m².    CC:  Main reason(s) for today's visit: Insomnia (trazadone refill)      HPI:     Chronic insomnia: on trazodone 150 mg nightly without problems. No depression or anxiety.     Patient Care Team:  Alin Padron MD as PCP - General  Waqas Velarde MD as Consulting " Physician (Allergy)  ____________________________________________________________________    REVIEW OF SYSTEMS    Review of Systems  Chronic insomnia  No mood changes    PHYSICAL EXAMINATION    Physical Exam  Psych: Alert, normal thought, mood, affect, and judgment     REVIEWED DATA:    Labs:     Lab Results   Component Value Date     02/06/2018    K 4.4 02/06/2018    AST 13 02/06/2018    ALT 12 02/06/2018    BUN 14 02/06/2018    CREATININE 0.97 02/06/2018    CREATININE 1.21 07/06/2017    EGFRIFNONA 79 02/06/2018    EGFRIFAFRI 95 02/06/2018       Lab Results   Component Value Date    HGBA1C 5.58 07/06/2017       Lab Results   Component Value Date     (H) 07/06/2017    HDL 50 07/06/2017    TRIG 76 07/06/2017    CHOLHDLRATIO 4.46 07/06/2017       Lab Results   Component Value Date    TSH 1.490 07/06/2017    FREET4 1.19 07/06/2017       Lab Results   Component Value Date    WBC 4.05 (L) 02/06/2018    HGB 13.4 (L) 02/06/2018    HGB 13.4 (L) 07/06/2017    HGB 12.6 (L) 06/02/2016     02/06/2018        Imaging:         Medical Tests:         Summary of old records / correspondence / consultant report:         Request outside records:         ALLERGIES  No Known Allergies     PFSH:     The following portions of the patient's history were reviewed and updated as appropriate: Allergies / Current Medications / Past Medical History / Surgical History / Social History / Family History    PROBLEM LIST   Patient Active Problem List   Diagnosis   • Insomnia   • Tinnitus   • Pure hypercholesterolemia   • Chewing tobacco nicotine dependence with nicotine-induced disorder       PAST MEDICAL HISTORY  Past Medical History:   Diagnosis Date   • Asthma    • History of skin cancer     left anterior chest   • Shingles 20's       SURGICAL HISTORY  Past Surgical History:   Procedure Laterality Date   • HERNIA REPAIR      LEFT SIDE   • HERNIA REPAIR  06/2016   • INGUINAL HERNIA REPAIR Right 6/6/2016    Procedure: RT  INGUINAL HERNIA REPAIR WITH MESH;  Surgeon: Greg Alatorre Jr., MD;  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)      Social History Main Topics   • Smoking status: Former Smoker     Packs/day: 1.00     Years: 10.00     Types: Cigarettes     Quit date:    • Smokeless tobacco: Current User     Types: Snuff      Comment: advised to quit smokeless tobacco   • Alcohol use No      Comment: 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 ~   • Drug use: No   • Sexual activity: Yes     Partners: Female     Other Topics Concern   • Not on file       FAMILY HISTORY  Family History   Problem Relation Age of Onset   • Alzheimer's disease Father 80         90   • Lymphoma Father    • Other Mother         borderline   • Stroke Brother    • Hypertension Brother    • Diabetes Maternal Grandmother    • Heart attack Maternal Grandfather 60   • Cancer Paternal Grandmother         another cancer   • Breast cancer Paternal Grandmother    • Cancer Paternal Grandfather            **Richard Disclaimer:   Much of this encounter note is an electronic transcription/translation of spoken language to printed text. The electronic translation of spoken language may permit erroneous, or at times, nonsensical words or phrases to be inadvertently transcribed. Although I have reviewed the note for such errors, some may still exist.

## 2019-02-20 DIAGNOSIS — G47.00 INSOMNIA, UNSPECIFIED TYPE: ICD-10-CM

## 2019-02-21 RX ORDER — TRAZODONE HYDROCHLORIDE 150 MG/1
150 TABLET ORAL
Qty: 30 TABLET | Refills: 0 | Status: SHIPPED | OUTPATIENT
Start: 2019-02-21 | End: 2019-03-22 | Stop reason: SDUPTHER

## 2019-03-20 DIAGNOSIS — G47.00 INSOMNIA, UNSPECIFIED TYPE: ICD-10-CM

## 2019-03-20 RX ORDER — TRAZODONE HYDROCHLORIDE 150 MG/1
TABLET ORAL
Qty: 30 TABLET | Refills: 0 | OUTPATIENT
Start: 2019-03-20

## 2019-03-22 ENCOUNTER — OFFICE VISIT (OUTPATIENT)
Dept: INTERNAL MEDICINE | Age: 64
End: 2019-03-22

## 2019-03-22 ENCOUNTER — TELEPHONE (OUTPATIENT)
Dept: INTERNAL MEDICINE | Age: 64
End: 2019-03-22

## 2019-03-22 VITALS
WEIGHT: 154 LBS | DIASTOLIC BLOOD PRESSURE: 60 MMHG | OXYGEN SATURATION: 98 % | HEIGHT: 71 IN | SYSTOLIC BLOOD PRESSURE: 110 MMHG | HEART RATE: 84 BPM | BODY MASS INDEX: 21.56 KG/M2 | TEMPERATURE: 98 F

## 2019-03-22 DIAGNOSIS — G47.00 INSOMNIA, UNSPECIFIED TYPE: ICD-10-CM

## 2019-03-22 DIAGNOSIS — F51.04 PSYCHOPHYSIOLOGICAL INSOMNIA: Primary | Chronic | ICD-10-CM

## 2019-03-22 DIAGNOSIS — E78.00 PURE HYPERCHOLESTEROLEMIA: Chronic | ICD-10-CM

## 2019-03-22 PROCEDURE — 99213 OFFICE O/P EST LOW 20 MIN: CPT | Performed by: INTERNAL MEDICINE

## 2019-03-22 RX ORDER — TRAZODONE HYDROCHLORIDE 150 MG/1
150 TABLET ORAL
Qty: 30 TABLET | Refills: 6 | Status: SHIPPED | OUTPATIENT
Start: 2019-03-22 | End: 2019-10-23 | Stop reason: SDUPTHER

## 2019-03-22 NOTE — ASSESSMENT & PLAN NOTE
Never started atorvastatin.   Maintain low fat/cholesterol diet.      Lab Results   Component Value Date     (H) 07/06/2017    HDL 50 07/06/2017    TRIG 76 07/06/2017    CHOLHDLRATIO 4.46 07/06/2017

## 2019-03-22 NOTE — PROGRESS NOTES
"[facility] [place] INTERNAL MEDICINE  WALDEMAR PADRON M.D.      Raymundo Mendez / 63 y.o. / male  03/22/2019      ASSESSMENT & PLAN:    Problem List Items Addressed This Visit        High    Insomnia - Primary (Chronic)    Overview     Stable. Continue trazodone 150 mg qHS.          Relevant Medications    traZODone (DESYREL) 150 MG tablet    Pure hypercholesterolemia (Chronic)    Current Assessment & Plan     Never started atorvastatin.   Maintain low fat/cholesterol diet.      Lab Results   Component Value Date     (H) 07/06/2017    HDL 50 07/06/2017    TRIG 76 07/06/2017    CHOLHDLRATIO 4.46 07/06/2017                 No orders of the defined types were placed in this encounter.    New Medications Ordered This Visit   Medications   • traZODone (DESYREL) 150 MG tablet     Sig: Take 1 tablet by mouth every night at bedtime.     Dispense:  30 tablet     Refill:  6     PT NEEDS APPT FOR FURTHER REFILLS       Summary/Discussion:  ·     Return in about 6 months (around 9/22/2019) for Annual physical.    ____________________________________________________________________    MEDICATIONS  Current Outpatient Medications   Medication Sig Dispense Refill   • montelukast (SINGULAIR) 10 MG tablet Take 10 mg by mouth Every Night.     • traZODone (DESYREL) 150 MG tablet Take 1 tablet by mouth every night at bedtime. 30 tablet 6     No current facility-administered medications for this visit.           VITALS:    Visit Vitals  /60   Pulse 84   Temp 98 °F (36.7 °C)   Ht 180.3 cm (70.98\")   Wt 69.9 kg (154 lb)   SpO2 98%   BMI 21.49 kg/m²       BP Readings from Last 3 Encounters:   03/22/19 110/60   08/06/18 120/66   02/06/18 118/70     Wt Readings from Last 3 Encounters:   03/22/19 69.9 kg (154 lb)   08/06/18 68.5 kg (151 lb)   02/06/18 67.7 kg (149 lb 3.2 oz)      Body mass index is 21.49 kg/m².    CC:  Main reason(s) for today's visit: Hyperlipidemia and Insomnia      HPI:     Chronic hyperlipidemia:  Current therapy include low " fat/cholesterol diet, previously did not start atorvastatin.    Most recent labs:   Lab Results   Component Value Date     (H) 07/06/2017    HDL 50 07/06/2017    TRIG 76 07/06/2017    CHOLHDLRATIO 4.46 07/06/2017     Chronic insomnia: on trazodone 150 mg qHS. Denies problems with medication.     Patient Care Team:  Alin Villegas MD as PCP - General  Waqas Velarde MD as Consulting Physician (Allergy)    ____________________________________________________________________    REVIEW OF SYSTEMS    Review of Systems  Constitutional neg  Resp neg  CV neg  Psych insomnia; neg for depression/anxiety       PHYSICAL EXAMINATION    Physical Exam  Constitutional  No distress  Psychiatric  Alert. Judgment and thought content normal. Mood normal    REVIEWED DATA:    Labs:     Lab Results   Component Value Date     02/06/2018    K 4.4 02/06/2018    AST 13 02/06/2018    ALT 12 02/06/2018    BUN 14 02/06/2018    CREATININE 0.97 02/06/2018    CREATININE 1.21 07/06/2017    EGFRIFNONA 79 02/06/2018    EGFRIFAFRI 95 02/06/2018       Lab Results   Component Value Date    HGBA1C 5.58 07/06/2017       Lab Results   Component Value Date     (H) 07/06/2017    HDL 50 07/06/2017    TRIG 76 07/06/2017    CHOLHDLRATIO 4.46 07/06/2017       Lab Results   Component Value Date    TSH 1.490 07/06/2017    FREET4 1.19 07/06/2017       Lab Results   Component Value Date    WBC 4.05 (L) 02/06/2018    HGB 13.4 (L) 02/06/2018    HGB 13.4 (L) 07/06/2017    HGB 12.6 (L) 06/02/2016     02/06/2018       Lab Results   Component Value Date    PROTEIN Negative 02/06/2018    GLUCOSEU Negative 02/06/2018    BLOODU Negative 02/06/2018    NITRITEU Negative 02/06/2018    LEUKOCYTESUR Negative 02/06/2018       Imaging:         Medical Tests:         Summary of old records / correspondence / consultant report:         Request outside records:         ALLERGIES  No Known Allergies     PFSH:     The following portions of the patient's history  were reviewed and updated as appropriate: Allergies / Current Medications / Past Medical History / Surgical History / Social History / Family History    PROBLEM LIST   Patient Active Problem List   Diagnosis   • Insomnia   • Tinnitus   • Pure hypercholesterolemia   • Chewing tobacco nicotine dependence with nicotine-induced disorder       PAST MEDICAL HISTORY  Past Medical History:   Diagnosis Date   • Asthma    • History of skin cancer     left anterior chest   • Hyperlipidemia    • Insomnia    • Shingles 20's       SURGICAL HISTORY  Past Surgical History:   Procedure Laterality Date   • HERNIA REPAIR      LEFT SIDE   • HERNIA REPAIR  2016   • INGUINAL HERNIA REPAIR Right 2016    Procedure: RT INGUINAL HERNIA REPAIR WITH MESH;  Surgeon: Greg Alatorre Jr., MD;  Location: [facility];  Service:    • VASECTOMY         SOCIAL HISTORY  Social History     Socioeconomic History   • Marital status:      Spouse name: Brianna*   • Number of children: 3   • Years of education: Not on file   • Highest education level: Not on file   Occupational History   • 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)   Tobacco Use   • Smoking status: Former Smoker     Packs/day: 1.00     Years: 10.00     Pack years: 10.00     Types: Cigarettes     Last attempt to quit:      Years since quittin.2   • Smokeless tobacco: Current User     Types: Snuff   • Tobacco comment: advised to quit smokeless tobacco   Substance and Sexual Activity   • Alcohol use: No     Comment: 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 ~   • Drug use: No   • Sexual activity: Yes     Partners: Female       FAMILY HISTORY  Family History   Problem Relation Age of Onset   • Alzheimer's disease Father 80         90   • Lymphoma Father    • Other Mother         borderline   • Stroke Brother    • Hypertension Brother    • Diabetes Maternal Grandmother    • Heart attack Maternal Grandfather 60   • Cancer Paternal Grandmother         another cancer   •  Breast cancer Paternal Grandmother    • Cancer Paternal Grandfather          **Richard Disclaimer:   Much of this encounter note is an electronic transcription/translation of spoken language to printed text. The electronic translation of spoken language may permit erroneous, or at times, nonsensical words or phrases to be inadvertently transcribed. Although I have reviewed the note for such errors, some may still exist.       Template created by Gurwinder Villegas MD

## 2019-03-22 NOTE — TELEPHONE ENCOUNTER
Can you please put CPE labs in for this pt as he would like his labs draw at the hospital here at [facility], [place]?

## 2019-07-22 ENCOUNTER — TELEPHONE (OUTPATIENT)
Dept: INTERNAL MEDICINE | Age: 64
End: 2019-07-22

## 2019-07-22 NOTE — TELEPHONE ENCOUNTER
Pt's physical is Oct 4th but he will do his labs 1wk prior but he wants to do them at the hospital.

## 2019-07-25 DIAGNOSIS — Z00.00 PREVENTATIVE HEALTH CARE: Primary | ICD-10-CM

## 2019-07-25 DIAGNOSIS — Z12.5 SCREENING PSA (PROSTATE SPECIFIC ANTIGEN): ICD-10-CM

## 2019-09-28 ENCOUNTER — APPOINTMENT (OUTPATIENT)
Dept: LAB | Facility: HOSPITAL | Age: 64
End: 2019-09-28

## 2019-09-28 LAB
ALBUMIN SERPL-MCNC: 3.9 G/DL (ref 3.5–5.2)
ALBUMIN/GLOB SERPL: 1.5 G/DL
ALP SERPL-CCNC: 57 U/L (ref 39–117)
ALT SERPL W P-5'-P-CCNC: 9 U/L (ref 1–41)
ANION GAP SERPL CALCULATED.3IONS-SCNC: 11.4 MMOL/L (ref 5–15)
AST SERPL-CCNC: 12 U/L (ref 1–40)
BASOPHILS # BLD AUTO: 0.04 10*3/MM3 (ref 0–0.2)
BASOPHILS NFR BLD AUTO: 1 % (ref 0–1.5)
BILIRUB SERPL-MCNC: 0.4 MG/DL (ref 0.2–1.2)
BILIRUB UR QL STRIP: NEGATIVE
BUN BLD-MCNC: 18 MG/DL (ref 8–23)
BUN/CREAT SERPL: 21.2 (ref 7–25)
CALCIUM SPEC-SCNC: 9 MG/DL (ref 8.6–10.5)
CHLORIDE SERPL-SCNC: 101 MMOL/L (ref 98–107)
CHOLEST SERPL-MCNC: 213 MG/DL (ref 0–200)
CLARITY UR: CLEAR
CO2 SERPL-SCNC: 26.6 MMOL/L (ref 22–29)
COLOR UR: YELLOW
CREAT BLD-MCNC: 0.85 MG/DL (ref 0.76–1.27)
DEPRECATED RDW RBC AUTO: 41.2 FL (ref 37–54)
EOSINOPHIL # BLD AUTO: 0.05 10*3/MM3 (ref 0–0.4)
EOSINOPHIL NFR BLD AUTO: 1.2 % (ref 0.3–6.2)
ERYTHROCYTE [DISTWIDTH] IN BLOOD BY AUTOMATED COUNT: 12.5 % (ref 12.3–15.4)
GFR SERPL CREATININE-BSD FRML MDRD: 91 ML/MIN/1.73
GLOBULIN UR ELPH-MCNC: 2.6 GM/DL
GLUCOSE BLD-MCNC: 97 MG/DL (ref 65–99)
GLUCOSE UR STRIP-MCNC: NEGATIVE MG/DL
HBA1C MFR BLD: 5.9 % (ref 4.8–5.6)
HCT VFR BLD AUTO: 41.8 % (ref 37.5–51)
HDLC SERPL QL: 3.87
HDLC SERPL-MCNC: 55 MG/DL (ref 40–60)
HGB BLD-MCNC: 13.9 G/DL (ref 13–17.7)
HGB UR QL STRIP.AUTO: NEGATIVE
IMM GRANULOCYTES # BLD AUTO: 0.01 10*3/MM3 (ref 0–0.05)
IMM GRANULOCYTES NFR BLD AUTO: 0.2 % (ref 0–0.5)
KETONES UR QL STRIP: NEGATIVE
LDLC SERPL CALC-MCNC: 147 MG/DL (ref 0–100)
LEUKOCYTE ESTERASE UR QL STRIP.AUTO: NEGATIVE
LYMPHOCYTES # BLD AUTO: 1.34 10*3/MM3 (ref 0.7–3.1)
LYMPHOCYTES NFR BLD AUTO: 32.1 % (ref 19.6–45.3)
MCH RBC QN AUTO: 30.2 PG (ref 26.6–33)
MCHC RBC AUTO-ENTMCNC: 33.3 G/DL (ref 31.5–35.7)
MCV RBC AUTO: 90.7 FL (ref 79–97)
MONOCYTES # BLD AUTO: 0.39 10*3/MM3 (ref 0.1–0.9)
MONOCYTES NFR BLD AUTO: 9.4 % (ref 5–12)
NEUTROPHILS # BLD AUTO: 2.34 10*3/MM3 (ref 1.7–7)
NEUTROPHILS NFR BLD AUTO: 56.1 % (ref 42.7–76)
NITRITE UR QL STRIP: NEGATIVE
NRBC BLD AUTO-RTO: 0 /100 WBC (ref 0–0.2)
PH UR STRIP.AUTO: 7 [PH] (ref 5–8)
PLATELET # BLD AUTO: 204 10*3/MM3 (ref 140–450)
PMV BLD AUTO: 9.8 FL (ref 6–12)
POTASSIUM BLD-SCNC: 4.5 MMOL/L (ref 3.5–5.2)
PROT SERPL-MCNC: 6.5 G/DL (ref 6–8.5)
PROT UR QL STRIP: NEGATIVE
PSA SERPL-MCNC: 1.59 NG/ML (ref 0–4)
RBC # BLD AUTO: 4.61 10*6/MM3 (ref 4.14–5.8)
SODIUM BLD-SCNC: 139 MMOL/L (ref 136–145)
SP GR UR STRIP: 1.02 (ref 1–1.03)
T4 FREE SERPL-MCNC: 1.31 NG/DL (ref 0.93–1.7)
TRIGL SERPL-MCNC: 55 MG/DL (ref 0–150)
TSH SERPL DL<=0.05 MIU/L-ACNC: 1.25 UIU/ML (ref 0.27–4.2)
UROBILINOGEN UR QL STRIP: NORMAL
VLDLC SERPL-MCNC: 11 MG/DL (ref 5–40)
WBC NRBC COR # BLD: 4.17 10*3/MM3 (ref 3.4–10.8)

## 2019-09-28 PROCEDURE — 83036 HEMOGLOBIN GLYCOSYLATED A1C: CPT | Performed by: INTERNAL MEDICINE

## 2019-09-28 PROCEDURE — 80061 LIPID PANEL: CPT | Performed by: INTERNAL MEDICINE

## 2019-09-28 PROCEDURE — 36415 COLL VENOUS BLD VENIPUNCTURE: CPT | Performed by: INTERNAL MEDICINE

## 2019-09-28 PROCEDURE — G0103 PSA SCREENING: HCPCS | Performed by: INTERNAL MEDICINE

## 2019-09-28 PROCEDURE — 84439 ASSAY OF FREE THYROXINE: CPT | Performed by: INTERNAL MEDICINE

## 2019-09-28 PROCEDURE — 85025 COMPLETE CBC W/AUTO DIFF WBC: CPT | Performed by: INTERNAL MEDICINE

## 2019-09-28 PROCEDURE — 81003 URINALYSIS AUTO W/O SCOPE: CPT | Performed by: INTERNAL MEDICINE

## 2019-09-28 PROCEDURE — 80053 COMPREHEN METABOLIC PANEL: CPT | Performed by: INTERNAL MEDICINE

## 2019-09-28 PROCEDURE — 84443 ASSAY THYROID STIM HORMONE: CPT | Performed by: INTERNAL MEDICINE

## 2019-10-04 ENCOUNTER — RESULTS ENCOUNTER (OUTPATIENT)
Dept: INTERNAL MEDICINE | Age: 64
End: 2019-10-04

## 2019-10-04 ENCOUNTER — OFFICE VISIT (OUTPATIENT)
Dept: INTERNAL MEDICINE | Age: 64
End: 2019-10-04

## 2019-10-04 VITALS
BODY MASS INDEX: 21.28 KG/M2 | TEMPERATURE: 97.3 F | DIASTOLIC BLOOD PRESSURE: 64 MMHG | HEART RATE: 79 BPM | HEIGHT: 71 IN | SYSTOLIC BLOOD PRESSURE: 102 MMHG | WEIGHT: 152 LBS | OXYGEN SATURATION: 98 %

## 2019-10-04 DIAGNOSIS — Z23 ENCOUNTER FOR IMMUNIZATION: Primary | ICD-10-CM

## 2019-10-04 DIAGNOSIS — Z00.00 ENCOUNTER FOR ANNUAL HEALTH EXAMINATION: ICD-10-CM

## 2019-10-04 DIAGNOSIS — F17.229 CHEWING TOBACCO NICOTINE DEPENDENCE WITH NICOTINE-INDUCED DISORDER: Chronic | ICD-10-CM

## 2019-10-04 DIAGNOSIS — F51.04 PSYCHOPHYSIOLOGICAL INSOMNIA: Chronic | ICD-10-CM

## 2019-10-04 DIAGNOSIS — E78.00 PURE HYPERCHOLESTEROLEMIA: Chronic | ICD-10-CM

## 2019-10-04 DIAGNOSIS — Z13.6 SCREENING FOR ISCHEMIC HEART DISEASE: ICD-10-CM

## 2019-10-04 PROCEDURE — 99396 PREV VISIT EST AGE 40-64: CPT | Performed by: INTERNAL MEDICINE

## 2019-10-04 PROCEDURE — 90471 IMMUNIZATION ADMIN: CPT | Performed by: INTERNAL MEDICINE

## 2019-10-04 PROCEDURE — 90674 CCIIV4 VAC NO PRSV 0.5 ML IM: CPT | Performed by: INTERNAL MEDICINE

## 2019-10-04 NOTE — ASSESSMENT & PLAN NOTE
10 year risk (ACC/AHA) 13 %. Recommended statin therapy.   Cardiac CT calcium scoring test recommended.   Triple vessel screening recommended.

## 2019-10-04 NOTE — PROGRESS NOTES
[facility] [place] [facility]  WALDEMAR PADRON M.D.      Raymundo Mendez / 64 y.o. / male  10/04/2019    CHIEF COMPLAINT     Annual Exam      HISTORY OF PRESENT ILLNESS      Reviewed chief complaint and details of complaint as documented by staff.     Raymundo presents for annual health maintenance visit.    · Last health maintenance visit: 2 years ago  · General health: good  · Lifestyle:  · Attempting to lose weight?: No   · Diet: eats moderately healthy  · Exercise: exercises 1-2 days/week   · Tobacco: uses smokeless nicotine   · Alcohol: recovering alcoholic  · Work: Full-time  · Reproductive health:  · Sexually active?: Yes   · Concern for STD?: No   · Sexual problems?: No problems   · Sees Urologist?: No   · Depression Screening:      PHQ-2/PHQ-9 Depression Screening 3/22/2019   Little interest or pleasure in doing things 0   Feeling down, depressed, or hopeless 0   Total Score 0         PHQ-2: 0 (Not depressed)     PHQ-9:     Patient Care Team:  Alin Padron MD as PCP - General  Waqas Velarde MD as Consulting Physician (Allergy)  ______________________________________________________________________    ALLERGIES  No Known Allergies     MEDICATIONS  Current Outpatient Medications   Medication Sig Dispense Refill   • montelukast (SINGULAIR) 10 MG tablet Take 10 mg by mouth Every Night.     • traZODone (DESYREL) 150 MG tablet Take 1 tablet by mouth every night at bedtime. 30 tablet 6     No current facility-administered medications for this visit.        PFSH:     The following portions of the patient's history were reviewed and updated as appropriate: Allergies / Current Medications / Past Medical History / Surgical History / Social History / Family History    PROBLEM LIST   Patient Active Problem List   Diagnosis   • Insomnia   • Tinnitus   • Pure hypercholesterolemia   • Chewing tobacco nicotine dependence with nicotine-induced disorder       PAST MEDICAL HISTORY  Past Medical History:   Diagnosis Date   • Asthma    •  History of skin cancer     left anterior chest   • Hyperlipidemia    • Insomnia    • Shingles 20's       SURGICAL HISTORY  Past Surgical History:   Procedure Laterality Date   • HERNIA REPAIR      LEFT SIDE   • HERNIA REPAIR  2016   • INGUINAL HERNIA REPAIR Right 2016    Procedure: RT INGUINAL HERNIA REPAIR WITH MESH;  Surgeon: Greg Alatorre Jr., MD;  Location: [facility];  Service:    • VASECTOMY         SOCIAL HISTORY  Social History     Socioeconomic History   • Marital status:      Spouse name: Brianna*   • Number of children: 3   • Years of education: Not on file   • Highest education level: Not on file   Occupational History   • 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)   Tobacco Use   • Smoking status: Former Smoker     Packs/day: 1.00     Years: 10.00     Pack years: 10.00     Types: Cigarettes     Last attempt to quit:      Years since quittin.7   • Smokeless tobacco: Current User     Types: Snuff   • Tobacco comment: daily snuff use   Substance and Sexual Activity   • Alcohol use: No     Comment: 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 ~   • Drug use: No   • Sexual activity: Yes     Partners: Female   Lifestyle   • Physical activity:     Days per week: 2 days     Minutes per session: Not on file   • Stress: Only a little       FAMILY HISTORY  Family History   Problem Relation Age of Onset   • Alzheimer's disease Father 80         90   • Lymphoma Father    • No Known Problems Mother    • Stroke Brother    • Hypertension Brother    • Diabetes Maternal Grandmother    • Heart attack Maternal Grandfather 60   • Cancer Paternal Grandmother         another cancer   • Breast cancer Paternal Grandmother    • Cancer Paternal Grandfather    • Prostate cancer Neg Hx    • Colon cancer Neg Hx        IMMUNIZATION HISTORY  Immunization History   Administered Date(s) Administered   • Flu Mist 2017   • Flu Vaccine Quad PF 6-35MO 2018   • Pneumococcal Polysaccharide (PPSV23)  "12/17/2018   • Tdap 06/23/2014   • flucelvax quad pfs =>4 YRS 10/04/2019       ______________________________________________________________________    REVIEW OF SYSTEMS     Review of Systems   Constitutional: Negative.    HENT: Negative.    Eyes: Negative.    Respiratory: Negative.    Cardiovascular: Negative.    Gastrointestinal: Negative.    Endocrine: Negative.    Genitourinary: Negative.    Musculoskeletal: Negative.         Fingers lock up at times   Skin: Negative.    Allergic/Immunologic: Negative.    Neurological: Negative.    Hematological: Negative.    Psychiatric/Behavioral: Negative.          VITALS     Visit Vitals  /64   Pulse 79   Temp 97.3 °F (36.3 °C)   Ht 180.3 cm (70.98\")   Wt 68.9 kg (152 lb)   SpO2 98%   BMI 21.21 kg/m²       BP Readings from Last 3 Encounters:   10/04/19 102/64   03/22/19 110/60   08/06/18 120/66     Wt Readings from Last 3 Encounters:   10/04/19 68.9 kg (152 lb)   03/22/19 69.9 kg (154 lb)   08/06/18 68.5 kg (151 lb)      Body mass index is 21.21 kg/m².    PHYSICAL EXAMINATION     Physical Exam   Constitutional: He is oriented to person, place, and time. He appears well-nourished. No distress.   HENT:   Head: Normocephalic.   Right Ear: External ear normal.   Left Ear: External ear normal.   Nose: Nose normal.   Mouth/Throat: Oropharynx is clear and moist and mucous membranes are normal. No oral lesions. Abnormal dentition.   Eyes: Conjunctivae and EOM are normal. Pupils are equal, round, and reactive to light. No scleral icterus.   Neck: Normal range of motion. Neck supple. No tracheal deviation present. No thyromegaly present.   Cardiovascular: Normal rate, regular rhythm, normal heart sounds and intact distal pulses. Exam reveals no gallop and no friction rub.   No murmur heard.  Pulmonary/Chest: Effort normal and breath sounds normal.   Abdominal: Soft. Normal appearance and bowel sounds are normal. He exhibits no distension and no mass. There is no " hepatosplenomegaly. There is no tenderness. No hernia.   Genitourinary: Prostate normal, testes normal and penis normal.   Musculoskeletal: Normal range of motion. He exhibits no edema or deformity.   Lymphadenopathy:     He has no cervical adenopathy.     He has no axillary adenopathy.        Right: No inguinal and no supraclavicular adenopathy present.        Left: No inguinal and no supraclavicular adenopathy present.   Neurological: He is alert and oriented to person, place, and time. He has normal reflexes. He displays normal reflexes. No cranial nerve deficit. He exhibits normal muscle tone. Coordination normal.   Skin: Skin is intact. No lesion (Negative for suspicious skin lesions/growths) and no rash noted. No cyanosis or erythema. No pallor. Nails show no clubbing.   No jaundice  No suspicious skin lesions.    Psychiatric: He has a normal mood and affect. His behavior is normal. Judgment and thought content normal. Cognition and memory are normal.         REVIEWED DATA      Labs:    Lab Results   Component Value Date     09/28/2019    K 4.5 09/28/2019    CALCIUM 9.0 09/28/2019    AST 12 09/28/2019    ALT 9 09/28/2019    BUN 18 09/28/2019    CREATININE 0.85 09/28/2019    CREATININE 0.97 02/06/2018    CREATININE 1.21 07/06/2017    EGFRIFNONA 91 09/28/2019    EGFRIFAFRI 95 02/06/2018       Lab Results   Component Value Date    GLU 76 02/06/2018    HGBA1C 5.90 (H) 09/28/2019    HGBA1C 5.58 07/06/2017    TSH 1.250 09/28/2019    FREET4 1.31 09/28/2019       Lab Results   Component Value Date    PSA 1.590 09/28/2019    PSA 0.544 07/06/2017    TESTOSTEROTT 697 07/06/2017       Lab Results   Component Value Date     (H) 09/28/2019    HDL 55 09/28/2019    TRIG 55 09/28/2019    CHOLHDLRATIO 3.87 09/28/2019       No components found for: [id number]    Lab Results   Component Value Date    WBC 4.17 09/28/2019    HGB 13.9 09/28/2019    MCV 90.7 09/28/2019     09/28/2019       Lab Results   Component  Value Date    PROTEIN Negative 02/06/2018    GLUCOSEU Negative 09/28/2019    BLOODU Negative 09/28/2019    NITRITEU Negative 09/28/2019    LEUKOCYTESUR Negative 09/28/2019          Lab Results   Component Value Date    HEPCVIRUSABY <0.1 07/06/2017       Imaging:           Medical Tests:       ______________________________________________________________________    ASSESSMENT & PLAN     ANNUAL WELLNESS EXAM / PHYSICAL     Other medical problems addressed today:  Problem List Items Addressed This Visit        High    Insomnia (Chronic)    Overview     Stable. Continue trazodone 150 mg qHS.          Relevant Medications    traZODone (DESYREL) 150 MG tablet    Pure hypercholesterolemia (Chronic)    Current Assessment & Plan     10 year risk (ACC/AHA) 13 %. Recommended statin therapy.   Cardiac CT calcium scoring test recommended.   Triple vessel screening recommended.             Medium    Chewing tobacco nicotine dependence with nicotine-induced disorder (Chronic)    Current Assessment & Plan     Advised to quit. Suggested NRT.  Recommended seeing dental specialist twice yearly.            Other Visit Diagnoses     Encounter for immunization    -  Primary    Relevant Orders    Flucelvax Quad=>4Years (8732-2945) (Completed)    Encounter for annual health examination        Relevant Orders    Cologuard - Stool, Per Rectum    Screening for ischemic heart disease        Relevant Orders    CT Cardiac Calcium Score Without Dye          Summary/Discussion:     ·     Next Appointment with me: Visit date not found    Return in about 1 year (around 10/4/2020) for Annual physical.      HEALTHCARE MAINTENANCE ISSUES       Cancer Screening:  · Colon: Initial/Next screening at age: OVERDUE or DEFERS COLONOSCOPY BUT WILL DO COLOGUARD EVERY 3 YEARS   · Repeat colon cancer screening: every 3 years  · Prostate: Performed today and recommended annual screening  · Testicular: Recommended monthly self exam  · Skin: Monthly self skin  examination, annual exam by health professional  · Lung: Does not meet criteria for lung cancer screening.   · Other:    Screening Labs & Tests:  · Lab results reviewed & discussed with with patient or orders placed today.  · EKG:  · CV Screening: Ischemic heart disease (Cardiac CT calcium score) and Vascular screening recommended/ordered (triple vessel screening)  · DEXA (75+ or risk factors):   · HEP C (If born 2760-7519 or risk factors): Previously had negative screen    Immunization/Vaccinations (to be given today unless deferred by patient)  · Influenza: Give flu shot today  · Hepatitis A: Recommended at pharmacy  · Tetanus/Pertussis: Up to date  · Pneumovax: Up to date  · Shingles: Recommended Shingrix at pharmacy  · Other:     Lifestyle Counseling:  · Lifestyle Modifications: Follow a low fat, low cholesterol diet, Quit chewin tobacco and Continue to abstain/curtail drinking  · Safety Issues: Always wear seatbelt, Avoid texting while driving   · Use sunscreen, regular skin examination  · Recommended annual dental/vision examination.  · Emotional/Stress/Sleep: Reviewed and  given when appropriate      Health Maintenance   Topic Date Due   • ZOSTER VACCINE (2 of 2) 09/07/2017   • PROSTATE CANCER SCREENING  09/28/2020   • LIPID PANEL  09/28/2020   • ANNUAL PHYSICAL  10/05/2020   • TDAP/TD VACCINES (2 - Td) 06/23/2024   • COLONOSCOPY  07/13/2027   • HEPATITIS C SCREENING  Completed   • INFLUENZA VACCINE  Completed         **Dragon Disclaimer:   Much of this encounter note is an electronic transcription/translation of spoken language to printed text. The electronic translation of spoken language may permit erroneous, or at times, nonsensical words or phrases to be inadvertently transcribed. Although I have reviewed the note for such errors, some may still exist.       Template created by Gurwinder Villegas MD

## 2019-10-23 DIAGNOSIS — G47.00 INSOMNIA, UNSPECIFIED TYPE: ICD-10-CM

## 2019-10-23 RX ORDER — TRAZODONE HYDROCHLORIDE 150 MG/1
150 TABLET ORAL
Qty: 30 TABLET | Refills: 5 | Status: SHIPPED | OUTPATIENT
Start: 2019-10-23 | End: 2020-04-23

## 2019-12-09 ENCOUNTER — TELEPHONE (OUTPATIENT)
Dept: INTERNAL MEDICINE | Age: 64
End: 2019-12-09

## 2019-12-09 DIAGNOSIS — J30.9 ALLERGIC RHINITIS, UNSPECIFIED SEASONALITY, UNSPECIFIED TRIGGER: Primary | ICD-10-CM

## 2019-12-09 NOTE — TELEPHONE ENCOUNTER
This question regards my husbands singulair prescription .He (Raymundo Mendez ) has previously had it refilled by his allergist Dr Velarde.He has not been seen by him in 3 yrs and they do not need to see him except for med refill .He has been completely out for a week and it was suggested he talk with Dr Villegas about refilling singular on his med list because he just saw him in past 2-3 months .His is showing signs of needing it ...Can Dr Villegas refill his singulair for him?thank you Brianna

## 2019-12-10 RX ORDER — MONTELUKAST SODIUM 10 MG/1
10 TABLET ORAL NIGHTLY
Qty: 30 TABLET | Refills: 11 | Status: SHIPPED | OUTPATIENT
Start: 2019-12-10 | End: 2020-12-17

## 2020-04-22 DIAGNOSIS — G47.00 INSOMNIA, UNSPECIFIED TYPE: ICD-10-CM

## 2020-04-23 RX ORDER — TRAZODONE HYDROCHLORIDE 150 MG/1
TABLET ORAL
Qty: 30 TABLET | Refills: 5 | Status: SHIPPED | OUTPATIENT
Start: 2020-04-23 | End: 2020-10-12 | Stop reason: SDUPTHER

## 2020-04-23 NOTE — TELEPHONE ENCOUNTER
I am approving his refill for trazodone but he must have a future appointment scheduled.   Recommended either annual physical 1 year from last one or a 6 month follow-up for medication check.

## 2020-05-15 ENCOUNTER — TELEMEDICINE (OUTPATIENT)
Dept: INTERNAL MEDICINE | Age: 65
End: 2020-05-15

## 2020-05-15 DIAGNOSIS — J30.9 ALLERGIC RHINITIS, UNSPECIFIED SEASONALITY, UNSPECIFIED TRIGGER: ICD-10-CM

## 2020-05-15 DIAGNOSIS — F51.04 PSYCHOPHYSIOLOGICAL INSOMNIA: Primary | Chronic | ICD-10-CM

## 2020-05-15 PROCEDURE — 99213 OFFICE O/P EST LOW 20 MIN: CPT | Performed by: INTERNAL MEDICINE

## 2020-05-15 NOTE — PROGRESS NOTES
[facility] [place] [facility] MEDICINE  WALDEMAR PADRON M.D.      Raymundo ADRIAN Vanessa / 65 y.o. / male  05/15/2020      CC:  Main reason(s) for today's visit: TELEHEALTH ENCOUNTER: Insomnia      HPI:     THIS WAS A MYCHART TELEHEALTH ENCOUNTER NECESSITATED BY CURRENT COVID-19 CRISIS.      You have chosen to receive care through a telehealth visit.  Do you consent to use a video/audio connection for your medical care today? Yes      Chronic psychophysiologic insomnia: doing fine on trazodone 150 mg qHS without problems. Denies depression or anxiety.     Allergic rhinitis on montelukast. Stable.       Patient Care Team:  Alin Padron MD as PCP - General  Waqas Velarde MD as Consulting Physician (Allergy)    ASSESSMENT & PLAN:    Problem List Items Addressed This Visit        High    Insomnia - Primary (Chronic)    Overview     Stable. Continue trazodone 150 mg qHS.          Relevant Medications    traZODone (DESYREL) 150 MG tablet       Unprioritized    Allergic rhinitis    Relevant Medications    montelukast (SINGULAIR) 10 MG tablet           Summary/Discussion:  •       Time spent: 7 minutes    Next Appointment with me: 10/12/2020    Return for Next scheduled follow up, ANNUAL PHYSICAL.    ____________________________________________________________________    MEDICATIONS  Current Outpatient Medications   Medication Sig Dispense Refill   • montelukast (SINGULAIR) 10 MG tablet Take 1 tablet by mouth Every Night. 30 tablet 11   • traZODone (DESYREL) 150 MG tablet TAKE ONE TABLET BY MOUTH EVERY NIGHT AT BEDTIME 30 tablet 5     No current facility-administered medications for this visit.           ____________________________________________________________________      REVIEW OF SYSTEMS    Review of Systems  ROS   Stable mood and sleep  No worsening allergic rhinitis     PHYSICAL EXAMINATION  There were no vitals taken for this visit.   No acute distress.  Alert with normal thought and judgment. Normal mood and affect.       REVIEWED  DATA:    Labs:         Imaging:         Medical Tests:         Summary of old records / correspondence / consultant report:          Request outside records:         ALLERGIES  No Known Allergies     PFSH:     The following portions of the patient's history were reviewed and updated as appropriate: Allergies / Current Medications / Past Medical History / Surgical History / Social History / Family History    PROBLEM LIST   Patient Active Problem List   Diagnosis   • Insomnia   • Tinnitus   • Pure hypercholesterolemia   • Chewing tobacco nicotine dependence with nicotine-induced disorder   • Allergic rhinitis       PAST MEDICAL HISTORY  Past Medical History:   Diagnosis Date   • Asthma    • History of skin cancer     left anterior chest   • Hyperlipidemia    • Insomnia    • Shingles 20's       SURGICAL HISTORY  Past Surgical History:   Procedure Laterality Date   • HERNIA REPAIR      LEFT SIDE   • HERNIA REPAIR  2016   • INGUINAL HERNIA REPAIR Right 2016    Procedure: RT INGUINAL HERNIA REPAIR WITH MESH;  Surgeon: Greg Alatorre Jr., MD;  Location: [facility];  Service:    • VASECTOMY         SOCIAL HISTORY  Social History     Socioeconomic History   • Marital status:      Spouse name: Brianna*   • Number of children: 3   • Years of education: Not on file   • Highest education level: Not on file   Occupational History   • 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)   Tobacco Use   • Smoking status: Former Smoker     Packs/day: 1.00     Years: 10.00     Pack years: 10.00     Types: Cigarettes     Last attempt to quit:      Years since quittin.3   • Smokeless tobacco: Current User     Types: Snuff   • Tobacco comment: daily snuff use   Substance and Sexual Activity   • Alcohol use: No     Comment: 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 ~   • Drug use: No   • Sexual activity: Yes     Partners: Female   Lifestyle   • Physical activity:     Days per week: 2 days     Minutes per session: Not on  file   • Stress: Only a little           **Dragon Disclaimer:   Much of this encounter note is an electronic transcription/translation of spoken language to printed text. The electronic translation of spoken language may permit erroneous, or at times, nonsensical words or phrases to be inadvertently transcribed. Although I have reviewed the note for such errors, some may still exist.     Template created by Gurwinder Villegas MD

## 2020-09-17 DIAGNOSIS — Z00.00 PREVENTATIVE HEALTH CARE: Primary | ICD-10-CM

## 2020-09-30 ENCOUNTER — RESULTS ENCOUNTER (OUTPATIENT)
Dept: INTERNAL MEDICINE | Age: 65
End: 2020-09-30

## 2020-09-30 DIAGNOSIS — Z00.00 PREVENTATIVE HEALTH CARE: ICD-10-CM

## 2020-10-05 LAB
ALBUMIN SERPL-MCNC: 4.2 G/DL (ref 3.5–5.2)
ALBUMIN/GLOB SERPL: 2.3 G/DL
ALP SERPL-CCNC: 65 U/L (ref 39–117)
ALT SERPL-CCNC: 11 U/L (ref 1–41)
APPEARANCE UR: CLEAR
AST SERPL-CCNC: 12 U/L (ref 1–40)
BASOPHILS # BLD AUTO: 0.04 10*3/MM3 (ref 0–0.2)
BASOPHILS NFR BLD AUTO: 0.9 % (ref 0–1.5)
BILIRUB SERPL-MCNC: 0.3 MG/DL (ref 0–1.2)
BILIRUB UR QL STRIP: NEGATIVE
BUN SERPL-MCNC: 17 MG/DL (ref 8–23)
BUN/CREAT SERPL: 15.2 (ref 7–25)
CALCIUM SERPL-MCNC: 8.9 MG/DL (ref 8.6–10.5)
CHLORIDE SERPL-SCNC: 106 MMOL/L (ref 98–107)
CHOLEST SERPL-MCNC: 225 MG/DL (ref 0–200)
CHOLEST/HDLC SERPL: 4.5 {RATIO}
CO2 SERPL-SCNC: 28.7 MMOL/L (ref 22–29)
COLOR UR: YELLOW
CREAT SERPL-MCNC: 1.12 MG/DL (ref 0.76–1.27)
EOSINOPHIL # BLD AUTO: 0.06 10*3/MM3 (ref 0–0.4)
EOSINOPHIL NFR BLD AUTO: 1.4 % (ref 0.3–6.2)
ERYTHROCYTE [DISTWIDTH] IN BLOOD BY AUTOMATED COUNT: 12.7 % (ref 12.3–15.4)
GLOBULIN SER CALC-MCNC: 1.8 GM/DL
GLUCOSE SERPL-MCNC: 105 MG/DL (ref 65–99)
GLUCOSE UR QL: NEGATIVE
HBA1C MFR BLD: 5.1 % (ref 4.8–5.6)
HCT VFR BLD AUTO: 41.8 % (ref 37.5–51)
HDLC SERPL-MCNC: 50 MG/DL (ref 40–60)
HGB BLD-MCNC: 14 G/DL (ref 13–17.7)
HGB UR QL STRIP: NEGATIVE
IMM GRANULOCYTES # BLD AUTO: 0.01 10*3/MM3 (ref 0–0.05)
IMM GRANULOCYTES NFR BLD AUTO: 0.2 % (ref 0–0.5)
KETONES UR QL STRIP: NEGATIVE
LDLC SERPL CALC-MCNC: 157 MG/DL (ref 0–100)
LEUKOCYTE ESTERASE UR QL STRIP: NEGATIVE
LYMPHOCYTES # BLD AUTO: 1.32 10*3/MM3 (ref 0.7–3.1)
LYMPHOCYTES NFR BLD AUTO: 30.1 % (ref 19.6–45.3)
MCH RBC QN AUTO: 30.1 PG (ref 26.6–33)
MCHC RBC AUTO-ENTMCNC: 33.5 G/DL (ref 31.5–35.7)
MCV RBC AUTO: 89.9 FL (ref 79–97)
MONOCYTES # BLD AUTO: 0.37 10*3/MM3 (ref 0.1–0.9)
MONOCYTES NFR BLD AUTO: 8.4 % (ref 5–12)
NEUTROPHILS # BLD AUTO: 2.59 10*3/MM3 (ref 1.7–7)
NEUTROPHILS NFR BLD AUTO: 59 % (ref 42.7–76)
NITRITE UR QL STRIP: NEGATIVE
NRBC BLD AUTO-RTO: 0 /100 WBC (ref 0–0.2)
PH UR STRIP: 6.5 [PH] (ref 5–8)
PLATELET # BLD AUTO: 200 10*3/MM3 (ref 140–450)
POTASSIUM SERPL-SCNC: 4.3 MMOL/L (ref 3.5–5.2)
PROT SERPL-MCNC: 6 G/DL (ref 6–8.5)
PROT UR QL STRIP: NEGATIVE
PSA SERPL-MCNC: 1.19 NG/ML (ref 0–4)
RBC # BLD AUTO: 4.65 10*6/MM3 (ref 4.14–5.8)
SODIUM SERPL-SCNC: 141 MMOL/L (ref 136–145)
SP GR UR: 1.02 (ref 1–1.03)
T4 FREE SERPL-MCNC: 1.19 NG/DL (ref 0.93–1.7)
TRIGL SERPL-MCNC: 89 MG/DL (ref 0–150)
TSH SERPL DL<=0.005 MIU/L-ACNC: 1.08 UIU/ML (ref 0.27–4.2)
UROBILINOGEN UR STRIP-MCNC: NORMAL MG/DL
VLDLC SERPL CALC-MCNC: 17.8 MG/DL
WBC # BLD AUTO: 4.39 10*3/MM3 (ref 3.4–10.8)

## 2020-10-12 ENCOUNTER — OFFICE VISIT (OUTPATIENT)
Dept: INTERNAL MEDICINE | Age: 65
End: 2020-10-12

## 2020-10-12 VITALS
HEIGHT: 71 IN | WEIGHT: 162 LBS | BODY MASS INDEX: 22.68 KG/M2 | HEART RATE: 85 BPM | TEMPERATURE: 97.7 F | OXYGEN SATURATION: 98 % | DIASTOLIC BLOOD PRESSURE: 76 MMHG | SYSTOLIC BLOOD PRESSURE: 110 MMHG

## 2020-10-12 DIAGNOSIS — G47.00 INSOMNIA, UNSPECIFIED TYPE: ICD-10-CM

## 2020-10-12 DIAGNOSIS — E78.00 PURE HYPERCHOLESTEROLEMIA: Chronic | ICD-10-CM

## 2020-10-12 DIAGNOSIS — Z00.00 ENCOUNTER FOR ANNUAL HEALTH EXAMINATION: Primary | ICD-10-CM

## 2020-10-12 PROCEDURE — 99396 PREV VISIT EST AGE 40-64: CPT | Performed by: INTERNAL MEDICINE

## 2020-10-12 PROCEDURE — 90471 IMMUNIZATION ADMIN: CPT | Performed by: INTERNAL MEDICINE

## 2020-10-12 PROCEDURE — 90686 IIV4 VACC NO PRSV 0.5 ML IM: CPT | Performed by: INTERNAL MEDICINE

## 2020-10-12 RX ORDER — TRAZODONE HYDROCHLORIDE 150 MG/1
150 TABLET ORAL
Qty: 30 TABLET | Refills: 5 | Status: SHIPPED | OUTPATIENT
Start: 2020-10-12 | End: 2021-04-14

## 2020-10-12 NOTE — PROGRESS NOTES
"[facility]  WALDEMAR PADRON M.D.      Raymundo ADRIAN Vanessa / 65 y.o. / male  10/12/2020    VITALS     Visit Vitals  /76   Pulse 85   Temp 97.7 °F (36.5 °C)   Ht 180.3 cm (71\")   Wt 73.5 kg (162 lb)   SpO2 98%   BMI 22.59 kg/m²       BP Readings from Last 3 Encounters:   10/12/20 110/76   10/04/19 102/64   03/22/19 110/60     Wt Readings from Last 3 Encounters:   10/12/20 73.5 kg (162 lb)   10/04/19 68.9 kg (152 lb)   03/22/19 69.9 kg (154 lb)      Body mass index is 22.59 kg/m².    MEDICATIONS     Current Outpatient Medications   Medication Sig Dispense Refill   • montelukast (SINGULAIR) 10 MG tablet Take 1 tablet by mouth Every Night. 30 tablet 11   • traZODone (DESYREL) 150 MG tablet Take 1 tablet by mouth every night at bedtime. 30 tablet 5     No current facility-administered medications for this visit.        _____________________________________________________________________________________    CHIEF COMPLAINT     Annual Exam (10/4/2019 last cpe)      HISTORY OF PRESENT ILLNESS      Raymundo presents for annual health maintenance visit.    · Last health maintenance visit: approximately 1 year ago  · General health: some medical problems  · Lifestyle:  · Attempting to lose weight?: No   · Diet: eats moderately healthy  · Exercise: exercises 2 days/week  · Tobacco: Former smoker and uses smokeless nicotine   · Alcohol: recovering alcoholic  · Work: Full-time  · Reproductive health:  · Sexually active?: Yes   · Concern for STD?: No   · Sexual problems?: No problems   · Sees Urologist?: No   · Depression Screening:      PHQ-2/PHQ-9 Depression Screening 10/12/2020   Little interest or pleasure in doing things 0   Feeling down, depressed, or hopeless 0   Total Score 0         PHQ-2: 0 (Not depressed)     PHQ-9: 0 (Negative screening for depression)    Patient Care Team:  Alin Padron MD as PCP - General  Waqas Velarde MD as Consulting Physician " (Allergy)  ______________________________________________________________________    ALLERGIES  No Known Allergies     PFSH:     The following portions of the patient's history were reviewed and updated as appropriate: Allergies / Current Medications / Past Medical History / Surgical History / Social History / Family History    PROBLEM LIST   Patient Active Problem List   Diagnosis   • Insomnia   • Tinnitus   • Pure hypercholesterolemia   • Chewing tobacco nicotine dependence with nicotine-induced disorder   • Allergic rhinitis       PAST MEDICAL HISTORY  Past Medical History:   Diagnosis Date   • Asthma    • History of skin cancer     left anterior chest   • Hyperlipidemia    • Insomnia    • Shingles 20's       SURGICAL HISTORY  Past Surgical History:   Procedure Laterality Date   • HERNIA REPAIR      LEFT SIDE   • HERNIA REPAIR  2016   • INGUINAL HERNIA REPAIR Right 2016    Procedure: RT INGUINAL HERNIA REPAIR WITH MESH;  Surgeon: Greg Alatorre Jr., MD;  Location: [facility];  Service:    • VASECTOMY         SOCIAL HISTORY  Social History     Socioeconomic History   • Marital status:      Spouse name: Brianna*   • Number of children: 3   • Years of education: Not on file   • Highest education level: Not on file   Occupational History   • 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)   Tobacco Use   • Smoking status: Former Smoker     Packs/day: 1.00     Years: 10.00     Pack years: 10.00     Types: Cigarettes     Quit date:      Years since quittin.7   • Smokeless tobacco: Current User     Types: Snuff   • Tobacco comment: daily snuff use   Substance and Sexual Activity   • Alcohol use: No     Comment: 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 ~   • Drug use: No   • Sexual activity: Yes     Partners: Female   Lifestyle   • Physical activity     Days per week: 2 days     Minutes per session: Not on file   • Stress: Only a little       FAMILY HISTORY  Family History   Problem Relation Age of  Onset   • Alzheimer's disease Father 80         90   • Lymphoma Father    • No Known Problems Mother    • Stroke Brother    • Hypertension Brother    • Diabetes Maternal Grandmother    • Heart attack Maternal Grandfather 60   • Cancer Paternal Grandmother         another cancer   • Breast cancer Paternal Grandmother    • Cancer Paternal Grandfather    • Prostate cancer Neg Hx    • Colon cancer Neg Hx        IMMUNIZATION HISTORY  Immunization History   Administered Date(s) Administered   • Flu Mist 2017   • Flu Vaccine Intradermal Quad 18-64YR 10/04/2019   • Flu Vaccine Quad PF 6-35MO 2018   • Flulaval/Fluarix/Fluzone Quad 2017, 10/12/2020   • Pneumococcal Polysaccharide (PPSV23) 2018   • Tdap 2014   • flucelvax quad pfs =>4 YRS 10/04/2019       ______________________________________________________________________    REVIEW OF SYSTEMS     Review of Systems   Constitutional: Negative.    HENT: Negative.    Eyes: Negative.    Respiratory: Negative.    Cardiovascular: Negative.  Negative for chest pain and palpitations.   Gastrointestinal: Negative.    Endocrine: Negative.    Genitourinary: Negative.    Musculoskeletal: Negative.    Skin: Negative.    Allergic/Immunologic: Negative.    Neurological: Negative.    Hematological: Negative.    Psychiatric/Behavioral:        Insomnia         PHYSICAL EXAMINATION     Physical Exam  Constitutional:       General: He is not in acute distress.     Appearance: Normal appearance. He is well-developed.   HENT:      Head: Normocephalic and atraumatic.      Right Ear: Tympanic membrane, ear canal and external ear normal.      Left Ear: Tympanic membrane, ear canal and external ear normal.   Eyes:      General: No scleral icterus.     Conjunctiva/sclera: Conjunctivae normal.      Pupils: Pupils are equal, round, and reactive to light.   Neck:      Musculoskeletal: Neck supple.      Thyroid: No thyroid mass or thyromegaly.      Trachea: No tracheal  deviation.   Cardiovascular:      Rate and Rhythm: Normal rate and regular rhythm.      Pulses: Normal pulses.      Heart sounds: Normal heart sounds.      Comments: No carotid bruit  Pulmonary:      Effort: Pulmonary effort is normal.      Breath sounds: Normal breath sounds.   Abdominal:      General: There is no distension.      Palpations: Abdomen is soft. There is no mass.      Tenderness: There is no abdominal tenderness.      Hernia: No hernia is present.   Genitourinary:     Penis: Normal.       Scrotum/Testes: Normal.      Comments: Deferred RADHAMES  Musculoskeletal: Normal range of motion.   Lymphadenopathy:      Cervical: No cervical adenopathy.      Upper Body:      Right upper body: No supraclavicular adenopathy.      Left upper body: No supraclavicular adenopathy.   Skin:     General: Skin is warm.      Coloration: Skin is not pale.      Findings: No lesion (Negative for suspicious skin lesions/growths) or rash.      Comments: No jaundice  No suspicious skin lesions.    Neurological:      Mental Status: He is alert and oriented to person, place, and time.      Cranial Nerves: No cranial nerve deficit.      Motor: No abnormal muscle tone.      Deep Tendon Reflexes: Reflexes are normal and symmetric.   Psychiatric:         Mood and Affect: Mood normal.         Behavior: Behavior normal.         Thought Content: Thought content normal.         Judgment: Judgment normal.         REVIEWED DATA      Labs:    Lab Results   Component Value Date     10/05/2020    K 4.3 10/05/2020    CALCIUM 8.9 10/05/2020    AST 12 10/05/2020    ALT 11 10/05/2020    BUN 17 10/05/2020    CREATININE 1.12 10/05/2020    CREATININE 0.85 09/28/2019    CREATININE 0.97 02/06/2018    EGFRIFNONA 66 10/05/2020    EGFRIFAFRI 80 10/05/2020       Lab Results   Component Value Date     (H) 10/05/2020    HGBA1C 5.10 10/05/2020    HGBA1C 5.90 (H) 09/28/2019    HGBA1C 5.58 07/06/2017    TSH 1.080 10/05/2020    FREET4 1.19 10/05/2020        Lab Results   Component Value Date    PSA 1.190 10/05/2020    PSA 1.590 09/28/2019    PSA 0.544 07/06/2017    TESTOSTEROTT 697 07/06/2017       Lab Results   Component Value Date     (H) 10/05/2020    HDL 50 10/05/2020    TRIG 89 10/05/2020    CHOLHDLRATIO 4.50 10/05/2020       No components found for: [id number]    Lab Results   Component Value Date    WBC 4.39 10/05/2020    HGB 14.0 10/05/2020    MCV 89.9 10/05/2020     10/05/2020       Lab Results   Component Value Date    PROTEIN Negative 10/05/2020    GLUCOSEU Negative 10/05/2020    BLOODU Negative 10/05/2020    NITRITEU Negative 10/05/2020    LEUKOCYTESUR Negative 10/05/2020          Lab Results   Component Value Date    HEPCVIRUSABY <0.1 07/06/2017       Imaging:           Medical Tests:       ______________________________________________________________________    ASSESSMENT & PLAN     ANNUAL WELLNESS EXAM / PHYSICAL     Other medical problems addressed today:  Problem List Items Addressed This Visit        High    Insomnia (Chronic)    Current Assessment & Plan     Continue trazodone 150 mg qHS.          Relevant Medications    traZODone (DESYREL) 150 MG tablet    Pure hypercholesterolemia (Chronic)    Current Assessment & Plan     10 year risk calculates at 10%.   He defers medication for now.   Recommended CT coronary calcium score and Triple vessel screening recommended.            Other Visit Diagnoses     Encounter for annual health examination    -  Primary          Summary/Discussion:     ·     Next Appointment with me: Visit date not found    Return in about 6 months (around 4/12/2021) for Reassess chronic medical problems.      HEALTHCARE MAINTENANCE ISSUES       Cancer Screening:  · Colon: Initial/Next screening at age: PATIENT DEFERS COLONOSCOPY BUT WILL DO COLOGUARD EVERY 3 YEARS   · Repeat colon cancer screening: every 3 years  · Prostate: Performed today and recommended annual screening  · Testicular: Recommended monthly  self exam  · Skin: Monthly self skin examination, annual exam by health professional  · Lung: Does not meet criteria for lung cancer screening.   · Other:    Screening Labs & Tests:  · Lab results reviewed & discussed with with patient or orders placed today.  · EKG:  · CV Screening: Lipid panel, Ischemic heart disease (Cardiac CT calcium score) recommended/ordered and Vascular screening recommended/ordered (triple vessel screening) recommended/ordered  · DEXA (75+ or risk factors):   · HEP C (If born [id number] or risk factors): Previously had negative screen  · Other:     Immunization/Vaccinations (to be given today unless deferred by patient)  · Influenza: Give flu shot today  · Hepatitis A: Recommended at pharmacy  · Hepatitis B: Not needed at this time  · Tetanus/Pertussis: Up to date  · Pneumovax: Not needed at this time  · Shingles: Recommended Shingrix at pharmacy  · Other:     Lifestyle Counseling:  · Lifestyle Modifications: Maintain a low sugar/carbohydrate diet, Follow a low fat, low cholesterol diet, Quit chewing tobacco, Continue to abstain/curtail drinking, Continue to abstain from smoking, Improve sleep hygiene and Discussed sexual issues, safe sex practices, contraception  · Safety Issues: Always wear seatbelt, Avoid texting while driving   · Use sunscreen, regular skin examination  · Recommended annual dental/vision examination.  · Emotional/Stress/Sleep: Reviewed and  given when appropriate      Health Maintenance   Topic Date Due   • ZOSTER VACCINE (2 of 2) 09/07/2017   • PROSTATE CANCER SCREENING  10/05/2021   • LIPID PANEL  10/05/2021   • ANNUAL PHYSICAL  10/13/2021   • COLOGUARD  12/26/2022   • TDAP/TD VACCINES (2 - Td) 06/23/2024   • HEPATITIS C SCREENING  Completed   • Pneumococcal Vaccine 0-64  Completed   • INFLUENZA VACCINE  Completed         Examiner was wearing KN95 mask, face shield and exam gloves during the entire duration of the visit. Patient was masked the entire time.    Minimum social distance of 6 ft maintained entire visit except if physical contact was necessary as documented.     **Dragon Disclaimer:   Much of this encounter note is an electronic transcription/translation of spoken language to printed text. The electronic translation of spoken language may permit erroneous, or at times, nonsensical words or phrases to be inadvertently transcribed. Although I have reviewed the note for such errors, some may still exist.       Template created by Gurwinder Villegas MD

## 2020-10-12 NOTE — ASSESSMENT & PLAN NOTE
10 year risk calculates at 10%.   He defers medication for now.   Recommended CT coronary calcium score and Triple vessel screening recommended.

## 2020-12-16 DIAGNOSIS — J30.9 ALLERGIC RHINITIS, UNSPECIFIED SEASONALITY, UNSPECIFIED TRIGGER: ICD-10-CM

## 2020-12-17 RX ORDER — MONTELUKAST SODIUM 10 MG/1
TABLET ORAL
Qty: 90 TABLET | Refills: 3 | Status: SHIPPED | OUTPATIENT
Start: 2020-12-17 | End: 2021-12-07

## 2021-03-19 ENCOUNTER — BULK ORDERING (OUTPATIENT)
Dept: CASE MANAGEMENT | Facility: OTHER | Age: 66
End: 2021-03-19

## 2021-03-19 DIAGNOSIS — Z23 IMMUNIZATION DUE: ICD-10-CM

## 2021-04-14 DIAGNOSIS — G47.00 INSOMNIA, UNSPECIFIED TYPE: ICD-10-CM

## 2021-04-14 RX ORDER — TRAZODONE HYDROCHLORIDE 150 MG/1
TABLET ORAL
Qty: 30 TABLET | Refills: 0 | Status: SHIPPED | OUTPATIENT
Start: 2021-04-14 | End: 2021-05-17

## 2021-05-14 DIAGNOSIS — G47.00 INSOMNIA, UNSPECIFIED TYPE: ICD-10-CM

## 2021-05-17 RX ORDER — TRAZODONE HYDROCHLORIDE 150 MG/1
TABLET ORAL
Qty: 30 TABLET | Refills: 2 | Status: SHIPPED | OUTPATIENT
Start: 2021-05-17 | End: 2021-08-16

## 2021-05-19 ENCOUNTER — OFFICE VISIT (OUTPATIENT)
Dept: INTERNAL MEDICINE | Age: 66
End: 2021-05-19

## 2021-05-19 VITALS
HEIGHT: 71 IN | HEART RATE: 88 BPM | TEMPERATURE: 98 F | WEIGHT: 169.8 LBS | OXYGEN SATURATION: 99 % | SYSTOLIC BLOOD PRESSURE: 120 MMHG | DIASTOLIC BLOOD PRESSURE: 86 MMHG | BODY MASS INDEX: 23.77 KG/M2

## 2021-05-19 DIAGNOSIS — F17.229 CHEWING TOBACCO NICOTINE DEPENDENCE WITH NICOTINE-INDUCED DISORDER: Chronic | ICD-10-CM

## 2021-05-19 DIAGNOSIS — E78.00 PURE HYPERCHOLESTEROLEMIA: Chronic | ICD-10-CM

## 2021-05-19 DIAGNOSIS — F51.04 PSYCHOPHYSIOLOGICAL INSOMNIA: Primary | Chronic | ICD-10-CM

## 2021-05-19 PROBLEM — J30.9 ALLERGIC RHINITIS: Chronic | Status: ACTIVE | Noted: 2020-05-15

## 2021-05-19 PROCEDURE — 99214 OFFICE O/P EST MOD 30 MIN: CPT | Performed by: INTERNAL MEDICINE

## 2021-05-19 NOTE — PROGRESS NOTES
"    I N T E R N A L  M E D I C I N E  J U N O H  K I M,  M D      ENCOUNTER DATE:  05/19/2021    Raymundo Mendez / 66 y.o. / male      CHIEF COMPLAINT / REASON FOR OFFICE VISIT     Insomnia (F/U )      ASSESSMENT & PLAN     Problem List Items Addressed This Visit        High    Insomnia - Primary (Chronic)    Overview     Continue trazodone 150 mg qHS          Relevant Medications    traZODone (DESYREL) 150 MG tablet    Pure hypercholesterolemia (Chronic)    Current Assessment & Plan     Maintain low fat/cholesterol diet.  Discussed statin therapy again.             Medium    Chewing tobacco nicotine dependence with nicotine-induced disorder (Chronic)    Current Assessment & Plan     Recommended NRT.              No orders of the defined types were placed in this encounter.    No orders of the defined types were placed in this encounter.      SUMMARY/DISCUSSION  •       Next Appointment with me: Visit date not found    Return in about 1 year (around 5/19/2022) for Reassess chronic medical problems.      VITAL SIGNS     Visit Vitals  /86   Pulse 88   Temp 98 °F (36.7 °C)   Ht 180.3 cm (70.98\")   Wt 77 kg (169 lb 12.8 oz)   SpO2 99%   BMI 23.69 kg/m²       BP Readings from Last 3 Encounters:   05/19/21 120/86   10/12/20 110/76   10/04/19 102/64     Wt Readings from Last 3 Encounters:   05/19/21 77 kg (169 lb 12.8 oz)   10/12/20 73.5 kg (162 lb)   10/04/19 68.9 kg (152 lb)     Body mass index is 23.69 kg/m².      MEDICATIONS AT THE TIME OF OFFICE VISIT     Current Outpatient Medications on File Prior to Visit   Medication Sig   • montelukast (SINGULAIR) 10 MG tablet TAKE ONE TABLET BY MOUTH ONCE NIGHTLY   • traZODone (DESYREL) 150 MG tablet TAKE ONE TABLET BY MOUTH EVERY NIGHT AT BEDTIME     No current facility-administered medications on file prior to visit.          HISTORY OF PRESENT ILLNESS     Chronic insomnia is stable on trazodone without problems   Probably will be retiring shortly.   Still chews tobacco. "   Previously deferred statin therapy for hyperlipidemia. + family history of CV disease.       Patient Care Team:  Alin Villegas MD as PCP - General  Waaqs Velarde MD as Consulting Physician (Allergy)    REVIEW OF SYSTEMS     No depression or anxiety       PHYSICAL EXAMINATION     Physical Exam  Psych: Normal mood and affect. Alert and intact judgment.       REVIEWED DATA     Labs:     Lab Results   Component Value Date     10/05/2020    K 4.3 10/05/2020    CALCIUM 8.9 10/05/2020    AST 12 10/05/2020    ALT 11 10/05/2020    BUN 17 10/05/2020    CREATININE 1.12 10/05/2020    CREATININE 0.85 09/28/2019    CREATININE 0.97 02/06/2018    EGFRIFNONA 66 10/05/2020    EGFRIFAFRI 80 10/05/2020       Lab Results   Component Value Date    HGBA1C 5.10 10/05/2020    HGBA1C 5.90 (H) 09/28/2019    HGBA1C 5.58 07/06/2017       Lab Results   Component Value Date     (H) 10/05/2020     (H) 09/28/2019    HDL 50 10/05/2020    TRIG 89 10/05/2020       Lab Results   Component Value Date    TSH 1.080 10/05/2020    FREET4 1.19 10/05/2020       Lab Results   Component Value Date    WBC 4.39 10/05/2020    HGB 14.0 10/05/2020     10/05/2020         Imaging:           Medical Tests:           Summary of old records / correspondence / consultant report:           Request outside records:             *Examiner was wearing KN95 mask, face shield and exam gloves during the entire duration of the visit. Patient was masked the entire time.   Minimum social distance of 6 ft maintained entire visit except if physical contact was necessary as documented.     **Dragon Disclaimer:   Much of this encounter note is an electronic transcription/translation of spoken language to printed text. The electronic translation of spoken language may permit erroneous, or at times, nonsensical words or phrases to be inadvertently transcribed. Although I have reviewed the note for such errors, some may still exist.     Template created by  Gurwinder Villegas MD

## 2021-08-15 DIAGNOSIS — G47.00 INSOMNIA, UNSPECIFIED TYPE: ICD-10-CM

## 2021-08-16 RX ORDER — TRAZODONE HYDROCHLORIDE 150 MG/1
TABLET ORAL
Qty: 30 TABLET | Refills: 5 | Status: SHIPPED | OUTPATIENT
Start: 2021-08-16 | End: 2022-02-08

## 2021-12-07 DIAGNOSIS — J30.9 ALLERGIC RHINITIS, UNSPECIFIED SEASONALITY, UNSPECIFIED TRIGGER: ICD-10-CM

## 2021-12-07 RX ORDER — MONTELUKAST SODIUM 10 MG/1
TABLET ORAL
Qty: 90 TABLET | Refills: 0 | Status: SHIPPED | OUTPATIENT
Start: 2021-12-07 | End: 2022-03-08

## 2022-02-07 DIAGNOSIS — G47.00 INSOMNIA, UNSPECIFIED TYPE: ICD-10-CM

## 2022-02-08 RX ORDER — TRAZODONE HYDROCHLORIDE 150 MG/1
TABLET ORAL
Qty: 30 TABLET | Refills: 5 | Status: SHIPPED | OUTPATIENT
Start: 2022-02-08 | End: 2022-06-20 | Stop reason: SDUPTHER

## 2022-03-08 DIAGNOSIS — J30.9 ALLERGIC RHINITIS, UNSPECIFIED SEASONALITY, UNSPECIFIED TRIGGER: ICD-10-CM

## 2022-03-08 RX ORDER — MONTELUKAST SODIUM 10 MG/1
TABLET ORAL
Qty: 90 TABLET | Refills: 0 | Status: SHIPPED | OUTPATIENT
Start: 2022-03-08 | End: 2022-05-26

## 2022-05-25 DIAGNOSIS — J30.9 ALLERGIC RHINITIS, UNSPECIFIED SEASONALITY, UNSPECIFIED TRIGGER: ICD-10-CM

## 2022-05-26 RX ORDER — MONTELUKAST SODIUM 10 MG/1
TABLET ORAL
Qty: 90 TABLET | Refills: 0 | Status: SHIPPED | OUTPATIENT
Start: 2022-05-26 | End: 2022-06-20 | Stop reason: SDUPTHER

## 2022-06-20 ENCOUNTER — OFFICE VISIT (OUTPATIENT)
Dept: INTERNAL MEDICINE | Age: 67
End: 2022-06-20

## 2022-06-20 VITALS
TEMPERATURE: 97.1 F | OXYGEN SATURATION: 99 % | HEART RATE: 80 BPM | SYSTOLIC BLOOD PRESSURE: 118 MMHG | DIASTOLIC BLOOD PRESSURE: 72 MMHG | BODY MASS INDEX: 23.02 KG/M2 | WEIGHT: 164.4 LBS | HEIGHT: 71 IN

## 2022-06-20 DIAGNOSIS — J30.9 ALLERGIC RHINITIS, UNSPECIFIED SEASONALITY, UNSPECIFIED TRIGGER: ICD-10-CM

## 2022-06-20 DIAGNOSIS — G47.00 INSOMNIA, UNSPECIFIED TYPE: ICD-10-CM

## 2022-06-20 DIAGNOSIS — F51.04 PSYCHOPHYSIOLOGICAL INSOMNIA: Primary | Chronic | ICD-10-CM

## 2022-06-20 PROCEDURE — 99212 OFFICE O/P EST SF 10 MIN: CPT | Performed by: NURSE PRACTITIONER

## 2022-06-20 RX ORDER — TRAZODONE HYDROCHLORIDE 150 MG/1
150 TABLET ORAL
Qty: 30 TABLET | Refills: 5 | Status: SHIPPED | OUTPATIENT
Start: 2022-06-20 | End: 2023-01-04 | Stop reason: SDUPTHER

## 2022-06-20 RX ORDER — MONTELUKAST SODIUM 10 MG/1
10 TABLET ORAL NIGHTLY
Qty: 90 TABLET | Refills: 0 | Status: SHIPPED | OUTPATIENT
Start: 2022-06-20 | End: 2022-12-05 | Stop reason: SDUPTHER

## 2022-06-20 NOTE — PROGRESS NOTES
"    I N T E R N A L  M E D I C I N E  Siomara Osborne, APRN    ENCOUNTER DATE:  06/20/2022    Raymundo Mendez / 67 y.o. / male      CHIEF COMPLAINT / REASON FOR OFFICE VISIT     Med Refill      ASSESSMENT & PLAN     Diagnoses and all orders for this visit:    1. Psychophysiological insomnia (Primary)  Overview:  Continue trazodone 150 mg qHS       2. Allergic rhinitis, unspecified seasonality, unspecified trigger  Overview:  Continue montelukast 10 mg qHS.     Orders:  -     montelukast (SINGULAIR) 10 MG tablet; Take 1 tablet by mouth Every Night for 30 days.  Dispense: 90 tablet; Refill: 0    3. Insomnia, unspecified type  Overview:  Continue trazodone 150 mg qHS     Orders:  -     traZODone (DESYREL) 150 MG tablet; Take 1 tablet by mouth every night at bedtime.  Dispense: 30 tablet; Refill: 5       SUMMARY/DISCUSSION  • Discussion of Annual Wellness Visit next visit. Patient does not want to do it today  • Discussion of Stress/Relaxations management techniques and relation to Insomnia- verbalized understanding  • Follow up Dr Villegas as scheduled  • I spent 20 min in direct care of this patient on this date of service. This time includes times spent by me in the following activities: Preparing for the visit, obtaining and/or reviewing a separately obtained history, performing a medically appropriate examination and/or evaluation, reviewing medical records, reviewing tests, ordering medications, tests, or procedures, counseling and educating the patient, documenting information in the medical record and reviewing office note/correspondence from other providers.       Return in about 3 months (around 9/20/2022) for Medicare Wellness.      VITAL SIGNS     Visit Vitals  /72 (BP Location: Right arm, Patient Position: Sitting)   Pulse 80   Temp 97.1 °F (36.2 °C) (Temporal)   Ht 180.3 cm (70.98\")   Wt 74.6 kg (164 lb 6.4 oz)   SpO2 99%   BMI 22.94 kg/m²           BP Readings from Last 3 Encounters:   06/20/22 118/72 "   05/19/21 120/86   10/12/20 110/76     Wt Readings from Last 3 Encounters:   06/20/22 74.6 kg (164 lb 6.4 oz)   05/19/21 77 kg (169 lb 12.8 oz)   10/12/20 73.5 kg (162 lb)     Body mass index is 22.94 kg/m².    Blood pressure readings recorded on patient flowsheet:  No flowsheet data found.          MEDICATIONS AT THE TIME OF OFFICE VISIT     Current Outpatient Medications on File Prior to Visit   Medication Sig Dispense Refill   • [DISCONTINUED] montelukast (SINGULAIR) 10 MG tablet TAKE ONE TABLET BY MOUTH ONCE NIGHTLY 90 tablet 0   • [DISCONTINUED] traZODone (DESYREL) 150 MG tablet TAKE ONE TABLET BY MOUTH EVERY NIGHT AT BEDTIME 30 tablet 5     No current facility-administered medications on file prior to visit.        HISTORY OF PRESENT ILLNESS     67 year old male being seen today for follow up visit for Insomnia and Medication refills. No pertinent concerns voiced. Negative for cough, congestion, or increased shortness of breath. States sleeping well with current dose of Trazodone.       Patient Care Team:  Alin Villegas MD as PCP - General  Waqas Velarde MD as Consulting Physician (Allergy)    REVIEW OF SYSTEMS     Review of Systems   Constitutional: Negative.    HENT: Negative.    Eyes: Negative.    Respiratory: Negative.    Cardiovascular: Negative.    Gastrointestinal: Negative.    Genitourinary: Negative.    Musculoskeletal: Negative.    Skin: Negative.    Neurological: Negative.    Hematological: Negative.    Psychiatric/Behavioral: Negative.           PHYSICAL EXAMINATION     Physical Exam  Constitutional:       Appearance: Normal appearance. He is normal weight.   Cardiovascular:      Rate and Rhythm: Normal rate and regular rhythm.      Pulses: Normal pulses.      Heart sounds: Normal heart sounds.   Pulmonary:      Effort: Pulmonary effort is normal.      Breath sounds: Normal breath sounds.   Musculoskeletal:         General: Normal range of motion.   Skin:     General: Skin is warm and dry.    Neurological:      Mental Status: He is alert and oriented to person, place, and time.   Psychiatric:         Mood and Affect: Mood normal.           REVIEWED DATA     Labs:     Lab Results   Component Value Date     10/05/2020    K 4.3 10/05/2020    CALCIUM 8.9 10/05/2020    AST 12 10/05/2020    ALT 11 10/05/2020    BUN 17 10/05/2020    CREATININE 1.12 10/05/2020    CREATININE 0.85 09/28/2019    CREATININE 0.97 02/06/2018    EGFRIFNONA 66 10/05/2020    EGFRIFAFRI 80 10/05/2020       Lab Results   Component Value Date    HGBA1C 5.10 10/05/2020    HGBA1C 5.90 (H) 09/28/2019    HGBA1C 5.58 07/06/2017       Lab Results   Component Value Date     (H) 10/05/2020     (H) 09/28/2019     (H) 07/06/2017    HDL 50 10/05/2020    HDL 55 09/28/2019    TRIG 89 10/05/2020    TRIG 55 09/28/2019       Lab Results   Component Value Date    TSH 1.080 10/05/2020    TSH 1.250 09/28/2019    TSH 1.490 07/06/2017    FREET4 1.19 10/05/2020    FREET4 1.31 09/28/2019    FREET4 1.19 07/06/2017       Lab Results   Component Value Date    WBC 4.39 10/05/2020    HGB 14.0 10/05/2020     10/05/2020       No results found for: MALBCRERATIO       Imaging:           Medical Tests:           Summary of old records / correspondence / consultant report:           Request outside records:             *Examiner was wearing KN95 mask and eye protection during the entire duration of the visit. Patient was masked the entire time. Minimum social distance of 6 ft maintained entire visit except if physical contact was necessary as documented.       Template created by Siomara BUNCH   Answers for HPI/ROS submitted by the patient on 6/15/2022  What is the primary reason for your visit?: Other  Please describe your symptoms.: check up for prescribed medications  Have you had these symptoms before?: Yes  How long have you been having these symptoms?: Greater than 2 weeks  Please list any medications you are currently taking  for this condition.: Trazadon, Montelucast  Please describe any probable cause for these symptoms. : insomnia and asthma

## 2022-11-04 ENCOUNTER — OFFICE VISIT (OUTPATIENT)
Dept: INTERNAL MEDICINE | Age: 67
End: 2022-11-04

## 2022-11-04 VITALS
WEIGHT: 164.4 LBS | SYSTOLIC BLOOD PRESSURE: 118 MMHG | BODY MASS INDEX: 23.02 KG/M2 | HEART RATE: 81 BPM | DIASTOLIC BLOOD PRESSURE: 68 MMHG | HEIGHT: 71 IN | OXYGEN SATURATION: 100 % | TEMPERATURE: 98.7 F

## 2022-11-04 DIAGNOSIS — J01.10 ACUTE NON-RECURRENT FRONTAL SINUSITIS: Primary | ICD-10-CM

## 2022-11-04 DIAGNOSIS — J30.89 NON-SEASONAL ALLERGIC RHINITIS, UNSPECIFIED TRIGGER: Chronic | ICD-10-CM

## 2022-11-04 DIAGNOSIS — F51.04 PSYCHOPHYSIOLOGICAL INSOMNIA: Chronic | ICD-10-CM

## 2022-11-04 PROCEDURE — 99214 OFFICE O/P EST MOD 30 MIN: CPT | Performed by: NURSE PRACTITIONER

## 2022-11-04 PROCEDURE — G0008 ADMIN INFLUENZA VIRUS VAC: HCPCS | Performed by: NURSE PRACTITIONER

## 2022-11-04 PROCEDURE — 90662 IIV NO PRSV INCREASED AG IM: CPT | Performed by: NURSE PRACTITIONER

## 2022-11-04 RX ORDER — FLUTICASONE PROPIONATE 50 MCG
2 SPRAY, SUSPENSION (ML) NASAL DAILY
Qty: 11.1 ML | Refills: 0 | Status: SHIPPED | OUTPATIENT
Start: 2022-11-04

## 2022-11-04 RX ORDER — AZITHROMYCIN 250 MG/1
TABLET, FILM COATED ORAL
Qty: 6 TABLET | Refills: 0 | Status: SHIPPED | OUTPATIENT
Start: 2022-11-04 | End: 2022-11-10

## 2022-11-04 NOTE — PROGRESS NOTES
"    I N T E R N A L  M E D I C I N E  Siomara Osborne, JULIO C    ENCOUNTER DATE:  11/04/2022    Raymundo Mendez / 67 y.o. / male      CHIEF COMPLAINT / REASON FOR OFFICE VISIT     Fatigue and Headache (Approximately a week )      ASSESSMENT & PLAN     Diagnoses and all orders for this visit:    1. Acute non-recurrent frontal sinusitis (Primary)    2. Psychophysiological insomnia  Overview:  Continue trazodone 150 mg qHS       3. Non-seasonal allergic rhinitis, unspecified trigger  Overview:  Continue montelukast 10 mg qHS.       Other orders  -     azithromycin (ZITHROMAX) 250 MG tablet; Take 2 tablets the first day, then 1 tablet daily for 4 days. (Take with food)  Dispense: 6 tablet; Refill: 0  -     fluticasone (Flonase) 50 MCG/ACT nasal spray; 2 sprays into the nostril(s) as directed by provider Daily.  Dispense: 11.1 mL; Refill: 0  -     Fluzone High-Dose 65+yrs ([id number])       SUMMARY/DISCUSSION  • Follow up with Dr Villegas as scheduled  • I spent 20 min in direct care of this patient on this date of service. This time includes times spent by me in the following activities: Preparing for the visit, obtaining and/or reviewing a separately obtained history, performing a medically appropriate examination and/or evaluation, reviewing medical records, reviewing tests, ordering medications, tests, or procedures, counseling and educating the patient, documenting information in the medical record and reviewing office note/correspondence from other providers.     Return in about 2 months (around 1/4/2023) for Next scheduled follow up.      VITAL SIGNS     Visit Vitals  /68 (BP Location: Left arm, Patient Position: Sitting, Cuff Size: Adult)   Pulse 81   Temp 98.7 °F (37.1 °C) (Temporal)   Ht 180.3 cm (70.98\")   Wt 74.6 kg (164 lb 6.4 oz)   SpO2 100%   BMI 22.94 kg/m²           BP Readings from Last 3 Encounters:   11/04/22 118/68   06/20/22 118/72   05/19/21 120/86     Wt Readings from Last 3 Encounters:   11/04/22 74.6 kg " (164 lb 6.4 oz)   06/20/22 74.6 kg (164 lb 6.4 oz)   05/19/21 77 kg (169 lb 12.8 oz)     Body mass index is 22.94 kg/m².    Blood pressure readings recorded on patient flowsheet:  No flowsheet data found.          MEDICATIONS AT THE TIME OF OFFICE VISIT     Current Outpatient Medications on File Prior to Visit   Medication Sig Dispense Refill   • traZODone (DESYREL) 150 MG tablet Take 1 tablet by mouth every night at bedtime. 30 tablet 5   • montelukast (SINGULAIR) 10 MG tablet Take 1 tablet by mouth Every Night for 30 days. 90 tablet 0     No current facility-administered medications on file prior to visit.        HISTORY OF PRESENT ILLNESS     67 year old male being seen today for frontal sinusitis with headache for past 2 days, fatigue, congestion and drainage. States did test negative for COVID-19 at home yesterday. States has been taking Mucinex without relief.   States insomnia improved with Trazodone.     Patient Care Team:  Alin Villegas MD as PCP - General  Waqas Velarde MD as Consulting Physician (Allergy)    REVIEW OF SYSTEMS     Review of Systems   Constitutional: Positive for fatigue. Negative for activity change, appetite change, chills and fever.   HENT: Positive for congestion, rhinorrhea, sinus pressure and sinus pain. Negative for sore throat and trouble swallowing.    Respiratory: Negative for cough, chest tightness and shortness of breath.    Cardiovascular: Negative.  Negative for chest pain and palpitations.   Skin: Negative.    Allergic/Immunologic: Positive for environmental allergies.   Neurological: Positive for headaches. Negative for dizziness.          PHYSICAL EXAMINATION     Physical Exam  HENT:      Right Ear: Tympanic membrane normal.      Left Ear: Tympanic membrane normal.      Nose: Nose normal.      Mouth/Throat:      Mouth: Mucous membranes are moist.      Pharynx: Oropharynx is clear. No posterior oropharyngeal erythema.   Cardiovascular:      Rate and Rhythm: Normal rate and  regular rhythm.      Pulses: Normal pulses.      Heart sounds: Normal heart sounds.   Pulmonary:      Effort: Pulmonary effort is normal. No respiratory distress.      Breath sounds: Rales present.   Lymphadenopathy:      Cervical: No cervical adenopathy.   Skin:     General: Skin is warm and dry.   Neurological:      Mental Status: He is alert. Mental status is at baseline.             REVIEWED DATA     Labs:     Lab Results   Component Value Date     10/05/2020    K 4.3 10/05/2020    CALCIUM 8.9 10/05/2020    AST 12 10/05/2020    ALT 11 10/05/2020    BUN 17 10/05/2020    CREATININE 1.12 10/05/2020    CREATININE 0.85 09/28/2019    CREATININE 0.97 02/06/2018    EGFRIFNONA 66 10/05/2020    EGFRIFAFRI 80 10/05/2020       Lab Results   Component Value Date    HGBA1C 5.10 10/05/2020    HGBA1C 5.90 (H) 09/28/2019    HGBA1C 5.58 07/06/2017       Lab Results   Component Value Date     (H) 10/05/2020     (H) 09/28/2019     (H) 07/06/2017    HDL 50 10/05/2020    HDL 55 09/28/2019    TRIG 89 10/05/2020    TRIG 55 09/28/2019       Lab Results   Component Value Date    TSH 1.080 10/05/2020    TSH 1.250 09/28/2019    TSH 1.490 07/06/2017    FREET4 1.19 10/05/2020    FREET4 1.31 09/28/2019    FREET4 1.19 07/06/2017       Lab Results   Component Value Date    WBC 4.39 10/05/2020    HGB 14.0 10/05/2020     10/05/2020       No results found for: MALBCRERATIO       Imaging:           Medical Tests:           Summary of old records / correspondence / consultant report:           Request outside records:           JULIO C Gould

## 2022-11-09 ENCOUNTER — TELEPHONE (OUTPATIENT)
Dept: INTERNAL MEDICINE | Age: 67
End: 2022-11-09

## 2022-11-09 NOTE — TELEPHONE ENCOUNTER
Pt saw daniel Friday for sinus problem . Finished abx ,butnot feeling good   Sob fatigue a lot of drainage , lossing voice .     Asking fo another round of abx and steroid . advise

## 2022-11-10 ENCOUNTER — HOSPITAL ENCOUNTER (OUTPATIENT)
Dept: GENERAL RADIOLOGY | Facility: HOSPITAL | Age: 67
Discharge: HOME | End: 2022-11-10
Admitting: NURSE PRACTITIONER

## 2022-11-10 ENCOUNTER — DOCUMENTATION (OUTPATIENT)
Dept: INTERNAL MEDICINE | Age: 67
End: 2022-11-10

## 2022-11-10 ENCOUNTER — OFFICE VISIT (OUTPATIENT)
Dept: INTERNAL MEDICINE | Age: 67
End: 2022-11-10

## 2022-11-10 VITALS
WEIGHT: 164 LBS | BODY MASS INDEX: 22.96 KG/M2 | HEART RATE: 85 BPM | SYSTOLIC BLOOD PRESSURE: 116 MMHG | DIASTOLIC BLOOD PRESSURE: 68 MMHG | TEMPERATURE: 98.4 F | HEIGHT: 71 IN | OXYGEN SATURATION: 98 %

## 2022-11-10 DIAGNOSIS — J06.9 UPPER RESPIRATORY INFECTION WITH COUGH AND CONGESTION: Primary | ICD-10-CM

## 2022-11-10 LAB
EXPIRATION DATE: NORMAL
FLUAV AG UPPER RESP QL IA.RAPID: NOT DETECTED
FLUBV AG UPPER RESP QL IA.RAPID: NOT DETECTED
INTERNAL CONTROL: NORMAL
Lab: NORMAL
SARS-COV-2 AG UPPER RESP QL IA.RAPID: NOT DETECTED

## 2022-11-10 PROCEDURE — 99213 OFFICE O/P EST LOW 20 MIN: CPT | Performed by: NURSE PRACTITIONER

## 2022-11-10 PROCEDURE — 87428 SARSCOV & INF VIR A&B AG IA: CPT | Performed by: NURSE PRACTITIONER

## 2022-11-10 PROCEDURE — 71046 X-RAY EXAM CHEST 2 VIEWS: CPT

## 2022-11-10 RX ORDER — AMOXICILLIN 500 MG/1
500 TABLET, FILM COATED ORAL 2 TIMES DAILY
Qty: 20 TABLET | Refills: 0 | Status: SHIPPED | OUTPATIENT
Start: 2022-11-10 | End: 2022-11-20

## 2022-11-10 RX ORDER — AZITHROMYCIN 250 MG/1
TABLET, FILM COATED ORAL
Qty: 6 TABLET | Refills: 0 | Status: SHIPPED | OUTPATIENT
Start: 2022-11-10 | End: 2022-11-10

## 2022-11-10 RX ORDER — METHYLPREDNISOLONE 4 MG/1
TABLET ORAL
Qty: 1 EACH | Refills: 0 | Status: SHIPPED | OUTPATIENT
Start: 2022-11-10 | End: 2023-01-04

## 2022-11-10 NOTE — ASSESSMENT & PLAN NOTE
States was recently in [place] with family- will check POCT COVID and Flu. Recently on Azithromycin without relief. Chest xray ordered for possible PNA.

## 2022-11-10 NOTE — PROGRESS NOTES
"        I N T E R N A L  M E D I C I N E  Siomara Osborne, APRN    ENCOUNTER DATE:  11/10/2022    Raymundo Mendez / 67 y.o. / male      CHIEF COMPLAINT / REASON FOR OFFICE VISIT     Chest Pain (Pain in upper back and left side) and Nasal Congestion (drainage)      ASSESSMENT & PLAN     Diagnoses and all orders for this visit:    1. Upper respiratory infection with cough and congestion (Primary)  Assessment & Plan:  States was recently in [place] with family- will check POCT COVID and Flu. Recently on Azithromycin without relief. Chest xray ordered for possible PNA.     Orders:  -     XR Chest PA & Lateral  -     POCT SARS-CoV-2 Antigen KADEN       SUMMARY/DISCUSSION  • POCT COVID and Flu negative in our office. Discussion of possible PNA and chest xray ordered. Rx Azithromycin and Medrol dose pack to pharmacy   • Follow up with Dr Villegas as scheduled and as needed  • I spent 20 min in direct care of this patient on this date of service. This time includes times spent by me in the following activities: Preparing for the visit, obtaining and/or reviewing a separately obtained history, performing a medically appropriate examination and/or evaluation, reviewing medical records, reviewing tests, ordering medications, tests, or procedures, counseling and educating the patient, documenting information in the medical record and reviewing office note/correspondence from other providers.     Return in about 8 weeks (around 1/4/2023) for Next scheduled follow up.      VITAL SIGNS     Visit Vitals  /68 (BP Location: Left arm, Patient Position: Sitting, Cuff Size: Adult)   Pulse 85   Temp 98.4 °F (36.9 °C) (Temporal)   Ht 180.3 cm (70.98\")   Wt 74.4 kg (164 lb)   SpO2 98%   BMI 22.89 kg/m²           BP Readings from Last 3 Encounters:   11/10/22 116/68   11/04/22 118/68   06/20/22 118/72     Wt Readings from Last 3 Encounters:   11/10/22 74.4 kg (164 lb)   11/04/22 74.6 kg (164 lb 6.4 oz)   06/20/22 74.6 kg (164 lb 6.4 oz)     Body " mass index is 22.89 kg/m².    Blood pressure readings recorded on patient flowsheet:  No flowsheet data found.          MEDICATIONS AT THE TIME OF OFFICE VISIT     Current Outpatient Medications on File Prior to Visit   Medication Sig Dispense Refill   • fluticasone (Flonase) 50 MCG/ACT nasal spray 2 sprays into the nostril(s) as directed by provider Daily. 11.1 mL 0   • traZODone (DESYREL) 150 MG tablet Take 1 tablet by mouth every night at bedtime. 30 tablet 5   • methylPREDNISolone (Medrol) 4 MG dose pack Take as directed on package instructions. 1 each 0   • montelukast (SINGULAIR) 10 MG tablet Take 1 tablet by mouth Every Night for 30 days. 90 tablet 0   • [DISCONTINUED] azithromycin (ZITHROMAX) 250 MG tablet Take 2 tablets the first day, then 1 tablet daily for 4 days. (Take with food) 6 tablet 0   • [DISCONTINUED] azithromycin (ZITHROMAX) 250 MG tablet Take 2 tablets the first day, then 1 tablet daily for 4 days. (Take with food) 6 tablet 0     No current facility-administered medications on file prior to visit.        HISTORY OF PRESENT ILLNESS     67 year old male being seen today as still not feeling well. States having chest discomfort with deep breaths especially on the left side where crackles are auscultated,  and sore throat. States took all of the initial Zpack without relief of congestion. Patient states no known exposure to COVID 19.     Patient Care Team:  Alin Villegas MD as PCP - General  Waqas Velarde MD as Consulting Physician (Allergy)    REVIEW OF SYSTEMS     Review of Systems   Constitutional: Negative for activity change, appetite change, fatigue and fever.   HENT: Positive for congestion, rhinorrhea and sore throat. Negative for ear pain, sinus pressure, sinus pain, tinnitus and trouble swallowing.    Eyes: Negative.    Respiratory: Positive for cough. Negative for chest tightness, shortness of breath and wheezing.    Cardiovascular: Positive for chest pain.   Skin: Negative.     Neurological: Negative for dizziness and headaches.          PHYSICAL EXAMINATION     Physical Exam  HENT:      Nose: Nose normal.      Mouth/Throat:      Mouth: Mucous membranes are moist.      Pharynx: Oropharynx is clear. Posterior oropharyngeal erythema present.   Cardiovascular:      Rate and Rhythm: Normal rate and regular rhythm.      Pulses: Normal pulses.      Heart sounds: Normal heart sounds.   Pulmonary:      Effort: Pulmonary effort is normal. No respiratory distress.      Breath sounds: Rales present.   Chest:      Chest wall: Tenderness present.   Lymphadenopathy:      Cervical: No cervical adenopathy.   Skin:     General: Skin is warm and dry.   Neurological:      Mental Status: He is alert. Mental status is at baseline.             REVIEWED DATA     Labs:     Lab Results   Component Value Date     10/05/2020    K 4.3 10/05/2020    CALCIUM 8.9 10/05/2020    AST 12 10/05/2020    ALT 11 10/05/2020    BUN 17 10/05/2020    CREATININE 1.12 10/05/2020    CREATININE 0.85 09/28/2019    CREATININE 0.97 02/06/2018    EGFRIFNONA 66 10/05/2020    EGFRIFAFRI 80 10/05/2020       Lab Results   Component Value Date    HGBA1C 5.10 10/05/2020    HGBA1C 5.90 (H) 09/28/2019    HGBA1C 5.58 07/06/2017       Lab Results   Component Value Date     (H) 10/05/2020     (H) 09/28/2019     (H) 07/06/2017    HDL 50 10/05/2020    HDL 55 09/28/2019    TRIG 89 10/05/2020    TRIG 55 09/28/2019       Lab Results   Component Value Date    TSH 1.080 10/05/2020    TSH 1.250 09/28/2019    TSH 1.490 07/06/2017    FREET4 1.19 10/05/2020    FREET4 1.31 09/28/2019    FREET4 1.19 07/06/2017       Lab Results   Component Value Date    WBC 4.39 10/05/2020    HGB 14.0 10/05/2020     10/05/2020       No results found for: MALBCRERATIO       Imaging:           Medical Tests:           Summary of old records / correspondence / consultant report:           Request outside records:           Siomara Osborne  APRN  Answers for HPI/ROS submitted by the patient on 11/10/2022  What is the primary reason for your visit?: Shortness of Breath  Progression since onset: gradually worsening  Aggravating factors: any activity

## 2022-11-10 NOTE — PROGRESS NOTES
Pt called office still having chest congestion stated he felt like he was having some difficulty breathing, I advised pt he should go to ED, pt stated he did not feel like he was emergent but wanted to be seen in office today, pt is scheduled for 1:45.

## 2022-11-11 ENCOUNTER — TELEPHONE (OUTPATIENT)
Dept: INTERNAL MEDICINE | Age: 67
End: 2022-11-11

## 2022-11-11 NOTE — TELEPHONE ENCOUNTER
Wife sent this massage through her my chart account       Klever saw SHAUNA Osborne today and doesn’t do my chart yet .We picked up the prescriptions..The steroids show 6 doses through the day .We just got them home .Should he start tonite or wait til am? Also there were 2 antibiotics to  and I don’t know how to connect on my chart with SHAUNA Osborne . Is he supposed to be taking them both ? At the same time? Sorry to ask these small questions..Thank you Brianna

## 2022-12-05 DIAGNOSIS — J30.9 ALLERGIC RHINITIS, UNSPECIFIED SEASONALITY, UNSPECIFIED TRIGGER: ICD-10-CM

## 2022-12-05 RX ORDER — MONTELUKAST SODIUM 10 MG/1
10 TABLET ORAL NIGHTLY
Qty: 90 TABLET | Refills: 0 | Status: SHIPPED | OUTPATIENT
Start: 2022-12-05 | End: 2022-12-07 | Stop reason: SDUPTHER

## 2022-12-07 ENCOUNTER — TELEPHONE (OUTPATIENT)
Dept: INTERNAL MEDICINE | Age: 67
End: 2022-12-07

## 2022-12-07 DIAGNOSIS — J30.9 ALLERGIC RHINITIS, UNSPECIFIED SEASONALITY, UNSPECIFIED TRIGGER: ICD-10-CM

## 2022-12-07 RX ORDER — MONTELUKAST SODIUM 10 MG/1
10 TABLET ORAL NIGHTLY
Qty: 90 TABLET | Refills: 0 | Status: SHIPPED | OUTPATIENT
Start: 2022-12-07 | End: 2023-03-07

## 2022-12-07 NOTE — TELEPHONE ENCOUNTER
Caller: Raymundo Mendez    Relationship: Self    Best call back number: [phone]  What is the best time to reach you: ANYTIME  Who are you requesting to speak with (clinical staff, provider,  specific staff member): CLINICAL STAFF    Do you know the name of the person who called: SELF  What was the call regarding:PATIENT CALLED AND STATED HE HAS BEEN TRYING TO GET HIS SNIGULIAR FOR AWHILE NOW. PATIENT STATES THE MEDICATION SHOULD BE SENT TO [facility] [place] AT [facility] [phone]. PATIENT STATES HE IS OUT OF MEDICATION. PLEASE CALL  Do you require a callback: YES

## 2023-01-04 ENCOUNTER — OFFICE VISIT (OUTPATIENT)
Dept: INTERNAL MEDICINE | Age: 68
End: 2023-01-04
Payer: MEDICARE

## 2023-01-04 VITALS
HEART RATE: 101 BPM | HEIGHT: 71 IN | DIASTOLIC BLOOD PRESSURE: 80 MMHG | BODY MASS INDEX: 23.24 KG/M2 | TEMPERATURE: 97.5 F | OXYGEN SATURATION: 98 % | SYSTOLIC BLOOD PRESSURE: 110 MMHG | WEIGHT: 166 LBS

## 2023-01-04 DIAGNOSIS — G47.00 INSOMNIA, UNSPECIFIED TYPE: ICD-10-CM

## 2023-01-04 DIAGNOSIS — E78.00 PURE HYPERCHOLESTEROLEMIA: Chronic | ICD-10-CM

## 2023-01-04 DIAGNOSIS — Z12.5 ENCOUNTER FOR SCREENING FOR MALIGNANT NEOPLASM OF PROSTATE: ICD-10-CM

## 2023-01-04 DIAGNOSIS — Z12.11 SCREENING FOR COLON CANCER: ICD-10-CM

## 2023-01-04 DIAGNOSIS — F17.229 CHEWING TOBACCO NICOTINE DEPENDENCE WITH NICOTINE-INDUCED DISORDER: Chronic | ICD-10-CM

## 2023-01-04 DIAGNOSIS — Z13.6 SCREENING FOR ISCHEMIC HEART DISEASE: ICD-10-CM

## 2023-01-04 DIAGNOSIS — Z00.00 MEDICARE ANNUAL WELLNESS VISIT, INITIAL: Primary | ICD-10-CM

## 2023-01-04 DIAGNOSIS — F51.04 PSYCHOPHYSIOLOGICAL INSOMNIA: Chronic | ICD-10-CM

## 2023-01-04 PROBLEM — J01.10 ACUTE NON-RECURRENT FRONTAL SINUSITIS: Status: RESOLVED | Noted: 2022-11-04 | Resolved: 2023-01-04

## 2023-01-04 PROBLEM — J06.9 UPPER RESPIRATORY INFECTION WITH COUGH AND CONGESTION: Status: RESOLVED | Noted: 2022-11-10 | Resolved: 2023-01-04

## 2023-01-04 PROCEDURE — 1159F MED LIST DOCD IN RCRD: CPT | Performed by: INTERNAL MEDICINE

## 2023-01-04 PROCEDURE — 99214 OFFICE O/P EST MOD 30 MIN: CPT | Performed by: INTERNAL MEDICINE

## 2023-01-04 PROCEDURE — 90677 PCV20 VACCINE IM: CPT | Performed by: INTERNAL MEDICINE

## 2023-01-04 PROCEDURE — 1170F FXNL STATUS ASSESSED: CPT | Performed by: INTERNAL MEDICINE

## 2023-01-04 PROCEDURE — G0438 PPPS, INITIAL VISIT: HCPCS | Performed by: INTERNAL MEDICINE

## 2023-01-04 PROCEDURE — G0009 ADMIN PNEUMOCOCCAL VACCINE: HCPCS | Performed by: INTERNAL MEDICINE

## 2023-01-04 RX ORDER — TRAZODONE HYDROCHLORIDE 150 MG/1
150 TABLET ORAL
Qty: 30 TABLET | Refills: 5 | Status: SHIPPED | OUTPATIENT
Start: 2023-01-04

## 2023-01-04 NOTE — ASSESSMENT & PLAN NOTE
Prior chest x-ray showed evidence of aortic calcification.  Recommended triple vascular screening and CT cardiac calcium score test.  Check lipid panel today and if LDL is higher I recommended starting statin therapy.  He seemed open to this suggestion.  Maintain low saturated fat diet.    Lab Results   Component Value Date     (H) 10/05/2020     (H) 09/28/2019     (H) 07/06/2017     Lab Results   Component Value Date    HDL 50 10/05/2020    HDL 55 09/28/2019    HDL 50 07/06/2017     Lab Results   Component Value Date    TRIG 89 10/05/2020    TRIG 55 09/28/2019    TRIG 76 07/06/2017     Lab Results   Component Value Date    CHOLHDLRATIO 4.50 10/05/2020    CHOLHDLRATIO 3.87 09/28/2019    CHOLHDLRATIO 4.46 07/06/2017

## 2023-01-04 NOTE — PROGRESS NOTES
I N T E R N A L  M E D I C I N E    J U N O H  K I M,  M D      ENCOUNTER DATE:  01/04/2023    Raymundo Mendez / 67 y.o. / male    MEDICARE ANNUAL WELLNESS VISIT       Chief Complaint: Medicare Wellness-Initial Visit       Patient's general assessment of his health since a year ago:     - Compared to one year ago, he feels his physical health is:   [x] About the same  [] Better  [] Little worse  [] Significantly worse  []     - Compared to one year ago, he feels his mental health is   [] About the same  [] Better  [x] Little worse (increased difficulty with names)  [] Significantly worse  []     HPI for other active medical problems:     Prior chest x-ray showed evidence of aortic calcification.  No documented history of coronary artery disease but has family history of heart disease.  He denies any anginal chest pains or dyspnea on exertion.  His risk factors include hyperlipidemia and history of tobacco use.  He also has family history of heart disease.  His older brother had a heart attack at age 73.  His other brother has history of stroke.  He has noticed some mild worsening of name recall.  He does have family history of Alzheimer's dementia.  Chronic insomnia remains stable on trazodone 150 mg.    In spring he started experiencing mild tingling sensation of his LUE. Denies weakness or pain. No neck discomfort. Symptom seems to have improved somewhat. Denies any other focal neuro changes/problems.        HISTORY       Recent Hospitalizations:    Recent hospitalization?:     If YES, location, date, and diagnoses:     · Location:   · Date:   · Principle Discharge Dx:   · Secondary Dx:       Patient Care Team:    Patient Care Team:  Alin Villegas MD as PCP - General  Waqas Velarde MD as Consulting Physician (Allergy)      Allergies:  Patient has no known allergies.    Medications:  Current Outpatient Medications on File Prior to Visit   Medication Sig Dispense Refill   • fluticasone (Flonase) 50 MCG/ACT nasal  spray 2 sprays into the nostril(s) as directed by provider Daily. 11.1 mL 0   • montelukast (SINGULAIR) 10 MG tablet Take 1 tablet by mouth Every Night for 90 days. 90 tablet 0     No current facility-administered medications on file prior to visit.        PFSH:     The following portions of the patient's history were reviewed and updated as appropriate: Allergies / Current Medications / Past Medical History / Surgical History / Social History / Family History    Problem List:  Patient Active Problem List   Diagnosis   • Insomnia   • Pure hypercholesterolemia   • Chewing tobacco nicotine dependence with nicotine-induced disorder   • Allergic rhinitis       Past Medical History:  Past Medical History:   Diagnosis Date   • Allergic    • Anxiety    • Asthma    • History of skin cancer     left anterior chest   • Hyperlipidemia    • Insomnia    • Shingles 20's   • Tinnitus 2016       Past Surgical History:  Past Surgical History:   Procedure Laterality Date   • HERNIA REPAIR      LEFT SIDE   • INGUINAL HERNIA REPAIR Right 2016    Procedure: RT INGUINAL HERNIA REPAIR WITH MESH;  Surgeon: Greg Alatorre Jr., MD;  Location: [facility];  Service:    • VASECTOMY         Social History:  Social History     Socioeconomic History   • Marital status:      Spouse name: Brianna*   • Number of children: 3   Tobacco Use   • Smoking status: Former     Packs/day: 1.00     Years: 10.00     Pack years: 10.00     Types: Cigarettes     Start date: 1976     Quit date: 1997     Years since quittin.0   • Smokeless tobacco: Current     Types: Snuff   • Tobacco comments:     I currently use smokeless tobacco daily   Substance and Sexual Activity   • Alcohol use: No     Comment: 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 ~   • Drug use: No   • Sexual activity: Yes     Partners: Female     Comment: vasectomy       Family History:  Family History   Problem Relation Age of Onset   • No Known Problems Mother    •  Alzheimer's disease Father 80         90   • Lymphoma Father    • Stroke Brother    • Hypertension Brother    • Heart attack Brother 73   • Diabetes Maternal Grandmother    • Heart attack Maternal Grandfather 60   • Cancer Paternal Grandmother         another cancer   • Breast cancer Paternal Grandmother    • Cancer Paternal Grandfather    • Alcohol abuse Paternal Uncle    • Prostate cancer Neg Hx    • Colon cancer Neg Hx          PATIENT ASSESSMENT     Vitals:  Vitals:    23 0904   BP: 110/80   Pulse: 101   Temp: 97.5 °F (36.4 °C)   SpO2: 98%   Weight: 75.3 kg (166 lb)   Height: 180.3 cm (70.98\")       BP Readings from Last 3 Encounters:   23 110/80   11/10/22 116/68   22 118/68     Wt Readings from Last 3 Encounters:   23 75.3 kg (166 lb)   11/10/22 74.4 kg (164 lb)   22 74.6 kg (164 lb 6.4 oz)      Body mass index is 23.17 kg/m².    Blood pressure readings recorded on patient flowsheet:  No flowsheet data found.         Review of Systems:    Review of Systems  Constitutional neg except per HPI   Resp neg  CV neg   GI negative   negative   MuSk negative  Neuro tingling sensation of LUE since Spring (denies weakness or pain)  No significant change in mood. Mood and affect stable. Difficulty with names (worse)  Skin lesion on right temple     Physical Exam:    Physical Exam  General: No acute distress  Psych: Normal thought and judgment   Cardiovascular Rate: normal. Rhythm: regular. Heart sounds: normal.   Pulm/Chest: Effort normal, breath sounds normal.  No carotid bruit.    Abdomen: Soft and nontender.   MuSk: BUE's and bilateral lower extremities are normal in strength, muscle tone/bulk   Neuro: DTR upper extremities slightly diminished on left compared to right; strength normal   Skin: large SK on right temple; multiple small strawberry angiomas on torso    Reviewed Data:    Labs:   Lab Results   Component Value Date     2023    K 4.5 2023    CALCIUM 8.9  01/04/2023    AST 15 01/04/2023    ALT 11 01/04/2023    BUN 16 01/04/2023    CREATININE 1.08 01/04/2023    CREATININE 1.12 10/05/2020    CREATININE 0.85 09/28/2019    EGFRIFNONA 66 10/05/2020    EGFRIFAFRI 80 10/05/2020       Lab Results   Component Value Date    HGBA1C 5.10 10/05/2020    HGBA1C 5.90 (H) 09/28/2019    HGBA1C 5.58 07/06/2017       Lab Results   Component Value Date     (H) 01/04/2023     (H) 10/05/2020     (H) 09/28/2019    HDL 50 01/04/2023    TRIG 77 01/04/2023    CHOLHDLRATIO 4.70 01/04/2023       Lab Results   Component Value Date    TSH 1.080 10/05/2020    FREET4 1.19 10/05/2020          Lab Results   Component Value Date    WBC 4.47 01/04/2023    HGB 14.0 01/04/2023    HGB 14.0 10/05/2020    HGB 13.9 09/28/2019     01/04/2023                   Lab Results   Component Value Date    PSA 0.950 01/04/2023    PSA 1.190 10/05/2020    PSA 1.590 09/28/2019       Imaging:          Medical Tests:          Screening for Glaucoma:  Previous screening for glaucoma?:   [x] YES  [] NO  []     Hearing Loss Screen:  Finger Rub Hearing Test (right ear):   [] PASSED  [] FAILED  [x] BORDERLINE  [] HAS HEARING AID  [] USING HEARING AID  [] NOT USING HEARING AID  []     Finger Rub Hearing Test (left ear):   [x] PASSED  [] FAILED  [] BORDERLINE  [] HAS HEARING AID  [] USING HEARING AID  [] NOT USING HEARING AID  []     Urinary Incontinence Screen:  Episodes of urinary incontinence? :  [] YES  [x] NO  [] N/A  [] WILL NEED FOLLOWUP  []       Depression Screen:  PHQ-2/PHQ-9 Depression Screening 1/4/2023   Retired PHQ-9 Total Score -   Retired Total Score -   Little Interest or Pleasure in Doing Things 0-->not at all   Feeling Down, Depressed or Hopeless 0-->not at all   PHQ-9: Brief Depression Severity Measure Score 0        PHQ-2:   [x] 0 -      NOT DEPRESSED  [] 1 -      NOT DEPRESSED  [] 2 -      NOT DEPRESSED  [] 3-6 -  DEPRESSED (PROCEED TO PHQ-9)  [] N/A - HAS DEPRESSION AND IS ON  TREATMENT  [] N/A - HAS DEPRESSION AND IS NOT ON TREATMENT    PHQ-9:   [x] 0         NEGATIVE SCREENING FOR DEPRESSION  [] 1-4      MINIMAL DEPRESSION  [] 5-9      MILD DEPRESSIONNOT DEPRESSED  [] 10-14  MODERATE DEPRESSION  [] 15-19  MODERATELY SEVERE DEPRESSION  [] 20-27  SEVERE DEPRESSION    FUNCTIONAL, FALL RISK, & COGNITIVE SCREENING (Components below):    DATA:    Functional & Cognitive Status 1/4/2023   Do you have difficulty preparing food and eating? No   Do you have difficulty bathing yourself, getting dressed or grooming yourself? No   Do you have difficulty using the toilet? No   Do you have difficulty moving around from place to place? No   Do you have trouble with steps or getting out of a bed or a chair? No   Current Diet Well Balanced Diet   Dental Exam Up to date   Eye Exam Up to date   Exercise (times per week) 1 times per week   Current Exercises Include No Regular Exercise;Walking   Do you need help using the phone?  No   Are you deaf or do you have serious difficulty hearing?  Yes   Do you need help with transportation? No   Do you need help shopping? No   Do you need help preparing meals?  No   Do you need help with housework?  No   Do you need help with laundry? No   Do you need help taking your medications? No   Do you need help managing money? No   Do you ever drive or ride in a car without wearing a seat belt? No   Do you have difficulty concentrating, remembering or making decisions? No         A) Assessment of Functional Ability:  (Assessment of ability to perform ADL's (showering/bathing, using toilet, dressing, feeding self, moving self around) and IADL's (use telephone, shop, prepare food, housekeep, do laundry, transport independently, take medications independently, and handle finances)    Degree of functional impairment: (based on assessment noted above)  [x] NONE  [] MILD  [] MODERATE  [] SEVERE  [] VERY SEVERE  []     B) Assessment of Fall Risk:  [x] LOW  [] MODERATE  [] HIGH  []  VERY HIGH     Need for further evaluation of gait, strength, and balance? :  [] YES  [x] NO    Timed Up and Go (TUG):   (>= 12 seconds indicates high risk for falling)    Observable abnormalities included:  [x] NORMAL GAIT   [] SLOW CAUTIOUS PACE  [] IMPAIRED BALANCE  [] SHORT STRIDES  [] MINIMAL ARM SWING  [] UNSTEADY (MILD)  [] UNSTEADY (MODERATE)  [] UNSTEADY (SEVERE)  [] SHUFFLING GAIT  [] USES ASSISTIVE DEVICE (CANE) PROPERLY  [] USES ASSISTIVE DEVICE (WALKER) PROPERLY  [] USES ASSISTIVE DEVICE (CANE) PROPERLY  [] DOES NOT USE ASSISTIVE DEVICE PROPERLY  [] IN WHEELCHAIR   [] NOT ABLE TO BE TESTED DUE TO SAFETY CONCERNS  []     C. Assessment of Cognitive Function:    Mini-Cog Test:     1) Registration (3 objects):     [x] YES  [] NO   [] N/A HAS DOCUMENTED DEMENTIA     2) Number of objects recalled:   [x] 3  [] 2  [] 1  [] 0     3) Clock Draw: Passed? :   [] YES  [] NO   [x] N/A  [] N/A HAS DOCUMENTED DEMENTIA     Further evaluation required? :   [] YES  [] NO   [x] CLOSE OBSERVATION NEEDED   [] SCHEDULE APPOINTMENT TO EVALUATE FURTHER   [] CONTINUE REGULAR FOLLOWUP AND MANAGEMENT   []    COUNSELING       A. Identification of Health Risk Factors:    Risk factors include: cardiovascular risk factors, tobacco use and poor hearing      B. Age-Appropriate Screening Schedule:  (Refer to the list below for future screening recommendations based on patient's age, sex and/or medical conditions. Orders for these recommended tests are listed in the plan section. The patient has been provided with a written plan)    Health Maintenance Topics  Health Maintenance   Topic Date Due   • ZOSTER VACCINE (2 of 2) 09/07/2017   • PROSTATE CANCER SCREENING  01/04/2024   • LIPID PANEL  01/04/2024   • TDAP/TD VACCINES (2 - Td or Tdap) 06/23/2024   • INFLUENZA VACCINE  Completed       Health Maintenance Topics Due or Over-Due  Health Maintenance Due   Topic Date Due   • ZOSTER VACCINE (2 of 2) 09/07/2017   • AAA SCREEN (ONE-TIME)  Never  done         C. Advanced Care Planning:    Advance Care Planning   ACP discussion was held with the patient during this visit. Patient has an advance directive (not in EMR), copy requested.       D. Patient Self-Management and Personalized Health Advice:    He has been provided with personalized counseling/information (including brochures/handouts) about:     -- tobacco cessation, recommended hearing testing, reducing risk for cardiovascular disease (heart, stroke, vascular) and possible evidence of cognitive problems and need for ongoing surveillance for progressive changes      He has been recommended for the following preventative services which has been performed today, will be ordered today or ordered/performed on upcoming follow-up visit:     -- SMOKING cessation counseling completed, NUTRITION counseling provided, EXERCISE counseling provided, CARDIOVASCULAR disease risk reduction counseling performed, FALL RISK assessment / plan of care completed, URINARY incontinence assessment done, VASCULAR screening recommended (including AAA screening), ISCHEMIC HEART DISEASE screening (CCT or stress test), PROSTATE CANCER screening (discussed and PSA ordered +/- RADHAMES performed), **COLORECTAL cancer screening (colonoscopy/Cologuard discussed or ordered), vaccination for PNEUMOVAX/PCV administered (or recommended), vaccination for SHINGRIX administered  (or recommended), vaccination for HEPATITIS A administered (or recommended), DIABETES screening performed (current/reviewed labs/lab ordered)      E. Miscellaneous Items:    -Aspirin use counseling: Does not need ASA (and currently is not on it)    -Discussed BMI with him. The BMI is in the acceptable range    -Reviewed use of high risk medication in the elderly: YES    -Reviewed for potential of harmful drug interactions in the elderly: YES        WRAP UP       Assessment & Plan:    1) MEDICARE ANNUAL WELLNESS VISIT    2) OTHER MEDICAL CONDITIONS ADDRESSED TODAY:             Problem List Items Addressed This Visit        High    Pure hypercholesterolemia (Chronic)    Current Assessment & Plan     Prior chest x-ray showed evidence of aortic calcification.  Recommended triple vascular screening and CT cardiac calcium score test.  Check lipid panel today and if LDL is higher I recommended starting statin therapy.  He seemed open to this suggestion.  Maintain low saturated fat diet.    Lab Results   Component Value Date     (H) 10/05/2020     (H) 09/28/2019     (H) 07/06/2017     Lab Results   Component Value Date    HDL 50 10/05/2020    HDL 55 09/28/2019    HDL 50 07/06/2017     Lab Results   Component Value Date    TRIG 89 10/05/2020    TRIG 55 09/28/2019    TRIG 76 07/06/2017     Lab Results   Component Value Date    CHOLHDLRATIO 4.50 10/05/2020    CHOLHDLRATIO 3.87 09/28/2019    CHOLHDLRATIO 4.46 07/06/2017             Relevant Orders    CT Cardiac Calcium Score Without Dye    Comprehensive Metabolic Panel (Completed)    Lipid Panel With / Chol / HDL Ratio (Completed)    CBC & Differential (Completed)       Medium    Insomnia (Chronic)    Overview     Continue trazodone 150 mg qHS          Relevant Medications    traZODone (DESYREL) 150 MG tablet       Low    Chewing tobacco nicotine dependence with nicotine-induced disorder (Chronic)    Current Assessment & Plan     Advised him to consider quitting.        Other Visit Diagnoses     Medicare annual wellness visit, initial    -  Primary    Encounter for screening for malignant neoplasm of prostate        Relevant Orders    PSA Screen (Completed)    Urinalysis With Culture If Indicated - Urine, Clean Catch (Completed)    Screening for colon cancer        Relevant Orders    Cologuard - Stool, Per Rectum    Screening for ischemic heart disease        Relevant Orders    CT Cardiac Calcium Score Without Dye                    Orders Placed This Encounter   Procedures   • CT Cardiac Calcium Score Without Dye   •  Pneumococcal Conjugate Vaccine 20-Valent All   • Cologuard - Stool, Per Rectum   • PSA Screen   • Comprehensive Metabolic Panel   • Lipid Panel With / Chol / HDL Ratio   • Urinalysis With Culture If Indicated - Urine, Clean Catch   • Microscopic Examination -   • CBC & Differential       Discussion / Summary:        Medications as of TODAY:              Current Outpatient Medications   Medication Sig Dispense Refill   • fluticasone (Flonase) 50 MCG/ACT nasal spray 2 sprays into the nostril(s) as directed by provider Daily. 11.1 mL 0   • montelukast (SINGULAIR) 10 MG tablet Take 1 tablet by mouth Every Night for 90 days. 90 tablet 0   • traZODone (DESYREL) 150 MG tablet Take 1 tablet by mouth every night at bedtime. 30 tablet 5     No current facility-administered medications for this visit.         FOLLOW-UP:            Return in about 6 months (around 7/4/2023) for Reassess chronic medical problems.                 Future Appointments   Date Time Provider Department Center   7/5/2023  9:45 AM Alin Villegas MD [facility]           After Visit Summary (AVS) including the Personalized Prevention  Plan Services (PPPS) was either printed and given to the patient at check-out today and/or sent to [facility] for review.         *Examiner was wearing KN95 mask and eye protection during the entire duration of the visit. Patient was masked the entire time. Minimum social distance of 6 ft maintained entire visit except if physical contact was necessary as documented.       Template created by Gurwinder Villegas MD

## 2023-01-05 LAB
ALBUMIN SERPL-MCNC: 4 G/DL (ref 3.5–5.2)
ALBUMIN/GLOB SERPL: 2 G/DL
ALP SERPL-CCNC: 65 U/L (ref 39–117)
ALT SERPL-CCNC: 11 U/L (ref 1–41)
APPEARANCE UR: CLEAR
AST SERPL-CCNC: 15 U/L (ref 1–40)
BACTERIA #/AREA URNS HPF: NORMAL /HPF
BASOPHILS # BLD AUTO: 0.04 10*3/MM3 (ref 0–0.2)
BASOPHILS NFR BLD AUTO: 0.9 % (ref 0–1.5)
BILIRUB SERPL-MCNC: 0.4 MG/DL (ref 0–1.2)
BILIRUB UR QL STRIP: NEGATIVE
BUN SERPL-MCNC: 16 MG/DL (ref 8–23)
BUN/CREAT SERPL: 14.8 (ref 7–25)
CALCIUM SERPL-MCNC: 8.9 MG/DL (ref 8.6–10.5)
CASTS URNS QL MICRO: NORMAL /LPF
CHLORIDE SERPL-SCNC: 104 MMOL/L (ref 98–107)
CHOLEST SERPL-MCNC: 235 MG/DL (ref 0–200)
CHOLEST/HDLC SERPL: 4.7 {RATIO}
CO2 SERPL-SCNC: 29 MMOL/L (ref 22–29)
COLOR UR: YELLOW
CREAT SERPL-MCNC: 1.08 MG/DL (ref 0.76–1.27)
EGFRCR SERPLBLD CKD-EPI 2021: 75.7 ML/MIN/1.73
EOSINOPHIL # BLD AUTO: 0.02 10*3/MM3 (ref 0–0.4)
EOSINOPHIL NFR BLD AUTO: 0.4 % (ref 0.3–6.2)
EPI CELLS #/AREA URNS HPF: NORMAL /HPF (ref 0–10)
ERYTHROCYTE [DISTWIDTH] IN BLOOD BY AUTOMATED COUNT: 12.7 % (ref 12.3–15.4)
GLOBULIN SER CALC-MCNC: 2 GM/DL
GLUCOSE SERPL-MCNC: 91 MG/DL (ref 65–99)
GLUCOSE UR QL STRIP: NEGATIVE
HCT VFR BLD AUTO: 41 % (ref 37.5–51)
HDLC SERPL-MCNC: 50 MG/DL (ref 40–60)
HGB BLD-MCNC: 14 G/DL (ref 13–17.7)
HGB UR QL STRIP: NEGATIVE
IMM GRANULOCYTES # BLD AUTO: 0.01 10*3/MM3 (ref 0–0.05)
IMM GRANULOCYTES NFR BLD AUTO: 0.2 % (ref 0–0.5)
KETONES UR QL STRIP: NEGATIVE
LDLC SERPL CALC-MCNC: 172 MG/DL (ref 0–100)
LEUKOCYTE ESTERASE UR QL STRIP: NEGATIVE
LYMPHOCYTES # BLD AUTO: 1.26 10*3/MM3 (ref 0.7–3.1)
LYMPHOCYTES NFR BLD AUTO: 28.2 % (ref 19.6–45.3)
MCH RBC QN AUTO: 31 PG (ref 26.6–33)
MCHC RBC AUTO-ENTMCNC: 34.1 G/DL (ref 31.5–35.7)
MCV RBC AUTO: 90.7 FL (ref 79–97)
MICRO URNS: NORMAL
MICRO URNS: NORMAL
MONOCYTES # BLD AUTO: 0.41 10*3/MM3 (ref 0.1–0.9)
MONOCYTES NFR BLD AUTO: 9.2 % (ref 5–12)
NEUTROPHILS # BLD AUTO: 2.73 10*3/MM3 (ref 1.7–7)
NEUTROPHILS NFR BLD AUTO: 61.1 % (ref 42.7–76)
NITRITE UR QL STRIP: NEGATIVE
NRBC BLD AUTO-RTO: 0 /100 WBC (ref 0–0.2)
PH UR STRIP: 6 [PH] (ref 5–7.5)
PLATELET # BLD AUTO: 195 10*3/MM3 (ref 140–450)
POTASSIUM SERPL-SCNC: 4.5 MMOL/L (ref 3.5–5.2)
PROT SERPL-MCNC: 6 G/DL (ref 6–8.5)
PROT UR QL STRIP: NEGATIVE
PSA SERPL-MCNC: 0.95 NG/ML (ref 0–4)
RBC # BLD AUTO: 4.52 10*6/MM3 (ref 4.14–5.8)
RBC #/AREA URNS HPF: NORMAL /HPF (ref 0–2)
SODIUM SERPL-SCNC: 140 MMOL/L (ref 136–145)
SP GR UR STRIP: 1.01 (ref 1–1.03)
TRIGL SERPL-MCNC: 77 MG/DL (ref 0–150)
URINALYSIS REFLEX: NORMAL
UROBILINOGEN UR STRIP-MCNC: 0.2 MG/DL (ref 0.2–1)
VLDLC SERPL CALC-MCNC: 13 MG/DL (ref 5–40)
WBC # BLD AUTO: 4.47 10*3/MM3 (ref 3.4–10.8)
WBC #/AREA URNS HPF: NORMAL /HPF (ref 0–5)

## 2023-01-06 NOTE — PROGRESS NOTES
MyChart:    Here are the result(s) of your test(s):     LDL cholesterol is significantly higher.  I would strongly recommend starting a statin medication.  Let me know if you would like to start it.    PSA level remains stable. Urinalysis is negative.     Kidney function, liver labs are electrolytes are stable (or within acceptable/normal limits).      Complete blood counts stable           Please do not hesitate to contact me if you have questions.

## 2023-01-16 DIAGNOSIS — E78.00 PURE HYPERCHOLESTEROLEMIA: Primary | Chronic | ICD-10-CM

## 2023-01-16 RX ORDER — ATORVASTATIN CALCIUM 10 MG/1
10 TABLET, FILM COATED ORAL DAILY
Qty: 30 TABLET | Refills: 3 | Status: SHIPPED | OUTPATIENT
Start: 2023-01-16 | End: 2023-01-17 | Stop reason: SDUPTHER

## 2023-01-17 DIAGNOSIS — E78.00 PURE HYPERCHOLESTEROLEMIA: Chronic | ICD-10-CM

## 2023-01-17 RX ORDER — ATORVASTATIN CALCIUM 10 MG/1
10 TABLET, FILM COATED ORAL DAILY
Qty: 30 TABLET | Refills: 3 | Status: SHIPPED | OUTPATIENT
Start: 2023-01-17

## 2023-01-17 NOTE — ASSESSMENT & PLAN NOTE
Start atorvastatin 10 mg daily and recheck labs in 3 months    Lab Results   Component Value Date     (H) 01/04/2023     (H) 10/05/2020     (H) 09/28/2019     Lab Results   Component Value Date    HDL 50 01/04/2023    HDL 50 10/05/2020    HDL 55 09/28/2019     Lab Results   Component Value Date    TRIG 77 01/04/2023    TRIG 89 10/05/2020    TRIG 55 09/28/2019     Lab Results   Component Value Date    CHOLHDLRATIO 4.70 01/04/2023    CHOLHDLRATIO 4.50 10/05/2020    CHOLHDLRATIO 3.87 09/28/2019

## 2023-05-22 DIAGNOSIS — E78.00 PURE HYPERCHOLESTEROLEMIA: Chronic | ICD-10-CM

## 2023-05-22 RX ORDER — ATORVASTATIN CALCIUM 10 MG/1
TABLET, FILM COATED ORAL
Qty: 30 TABLET | Refills: 0 | Status: SHIPPED | OUTPATIENT
Start: 2023-05-22

## 2023-06-03 DIAGNOSIS — J30.9 ALLERGIC RHINITIS, UNSPECIFIED SEASONALITY, UNSPECIFIED TRIGGER: ICD-10-CM

## 2023-06-05 RX ORDER — MONTELUKAST SODIUM 10 MG/1
TABLET ORAL
Qty: 30 TABLET | Refills: 5 | Status: SHIPPED | OUTPATIENT
Start: 2023-06-05

## 2023-07-24 ENCOUNTER — OFFICE VISIT (OUTPATIENT)
Dept: INTERNAL MEDICINE | Age: 68
End: 2023-07-24
Payer: MEDICARE

## 2023-07-24 VITALS
SYSTOLIC BLOOD PRESSURE: 118 MMHG | HEIGHT: 71 IN | DIASTOLIC BLOOD PRESSURE: 74 MMHG | TEMPERATURE: 97.6 F | BODY MASS INDEX: 21.7 KG/M2 | WEIGHT: 155 LBS | HEART RATE: 98 BPM | OXYGEN SATURATION: 98 %

## 2023-07-24 DIAGNOSIS — J01.10 ACUTE NON-RECURRENT FRONTAL SINUSITIS: Primary | ICD-10-CM

## 2023-07-24 PROCEDURE — 1159F MED LIST DOCD IN RCRD: CPT

## 2023-07-24 PROCEDURE — 99213 OFFICE O/P EST LOW 20 MIN: CPT

## 2023-07-24 RX ORDER — AMOXICILLIN AND CLAVULANATE POTASSIUM 875; 125 MG/1; MG/1
1 TABLET, FILM COATED ORAL 2 TIMES DAILY
Qty: 14 TABLET | Refills: 0 | Status: SHIPPED | OUTPATIENT
Start: 2023-07-24 | End: 2023-07-31

## 2023-07-24 NOTE — PROGRESS NOTES
"    I N T E R N A L  M E D I C I N E  Elena Marsh, APRN    ENCOUNTER DATE:  07/24/2023    Raymundo Mendez / 68 y.o. / male      CHIEF COMPLAINT / REASON FOR OFFICE VISIT     Sinusitis      ASSESSMENT & PLAN     Diagnoses and all orders for this visit:    1. Acute non-recurrent frontal sinusitis (Primary)  -     amoxicillin-clavulanate (AUGMENTIN) 875-125 MG per tablet; Take 1 tablet by mouth 2 (Two) Times a Day for 7 days.  Dispense: 14 tablet; Refill: 0         SUMMARY/DISCUSSION  Recommend Mucinex DM PRN, daily Zyrtec, resuming use of Flonase nasal spray.    Follow up in our office if no improvement in symptoms.    Aware to visit ER for any acutely worsening symptoms.  Follow up with PCP as scheduled.        Next Appointment with me: Visit date not found    Return for Next scheduled follow up.      VITAL SIGNS     Visit Vitals  /74   Pulse 98   Temp 97.6 °F (36.4 °C) (Oral)   Ht 180.3 cm (70.98\")   Wt 70.3 kg (155 lb)   SpO2 98%   BMI 21.63 kg/m²             Wt Readings from Last 3 Encounters:   07/24/23 70.3 kg (155 lb)   01/04/23 75.3 kg (166 lb)   11/10/22 74.4 kg (164 lb)     Body mass index is 21.63 kg/m².        MEDICATIONS AT THE TIME OF OFFICE VISIT     Current Outpatient Medications on File Prior to Visit   Medication Sig Dispense Refill    atorvastatin (LIPITOR) 10 MG tablet TAKE 1 TABLET BY MOUTH EVERY DAY 30 tablet 11    fluticasone (Flonase) 50 MCG/ACT nasal spray 2 sprays into the nostril(s) as directed by provider Daily. 11.1 mL 0    montelukast (SINGULAIR) 10 MG tablet TAKE 1 TABLET BY MOUTH EVERY DAY AT NIGHT 30 tablet 5    traZODone (DESYREL) 150 MG tablet TAKE 1 TABLET BY MOUTH AT BEDTIME 30 tablet 5     No current facility-administered medications on file prior to visit.        HISTORY OF PRESENT ILLNESS     Here today for 10 day history of nasal congestion, frontal sinus pressure/ pain, right ear pressure.  Reports illness started 10 days ago with fatigue, fever, chills.  He has not tested " himself for COVID-19 at home.  Denies any chest pain, shortness of breath.  Eating, drinking, urinating well.  He remains on prescribed montelukast but is not taking Flonase nasal spray.        Patient Care Team:  Alin Villegas MD as PCP - General  Waqas Velarde MD as Consulting Physician (Allergy)    REVIEW OF SYSTEMS     Review of Systems   Constitutional:  Positive for chills, fatigue and fever. Negative for unexpected weight change.   HENT:  Positive for congestion, ear pain and postnasal drip. Negative for ear discharge.    Respiratory:  Negative for cough, chest tightness and shortness of breath.    Cardiovascular:  Negative for chest pain, palpitations and leg swelling.   Neurological:  Negative for dizziness, weakness, light-headedness and headaches.   Psychiatric/Behavioral:  The patient is not nervous/anxious.         PHYSICAL EXAMINATION     Physical Exam  Vitals reviewed.   Constitutional:       General: He is not in acute distress.     Appearance: Normal appearance. He is not ill-appearing, toxic-appearing or diaphoretic.   HENT:      Head: Normocephalic and atraumatic.      Right Ear: Ear canal and external ear normal. A middle ear effusion is present. Tympanic membrane is not erythematous.      Left Ear: Tympanic membrane, ear canal and external ear normal. There is no impacted cerumen.      Nose: Nose normal. Congestion present. No rhinorrhea.      Right Sinus: Frontal sinus tenderness present. No maxillary sinus tenderness.      Left Sinus: Frontal sinus tenderness present. No maxillary sinus tenderness.      Mouth/Throat:      Mouth: Mucous membranes are moist.      Pharynx: Oropharynx is clear. No oropharyngeal exudate or posterior oropharyngeal erythema.   Eyes:      Extraocular Movements: Extraocular movements intact.      Conjunctiva/sclera: Conjunctivae normal.      Pupils: Pupils are equal, round, and reactive to light.   Cardiovascular:      Rate and Rhythm: Normal rate and regular rhythm.       Heart sounds: Normal heart sounds.   Pulmonary:      Effort: Pulmonary effort is normal.      Breath sounds: Normal breath sounds.   Lymphadenopathy:      Cervical: No cervical adenopathy.   Neurological:      Mental Status: He is alert and oriented to person, place, and time. Mental status is at baseline.   Psychiatric:         Mood and Affect: Mood normal.         Behavior: Behavior normal.         Thought Content: Thought content normal.         Judgment: Judgment normal.         REVIEWED DATA     Labs:           Imaging:            Medical Tests:           Summary of old records / correspondence / consultant report:           Request outside records:

## 2023-08-01 ENCOUNTER — OFFICE VISIT (OUTPATIENT)
Dept: INTERNAL MEDICINE | Age: 68
End: 2023-08-01
Payer: MEDICARE

## 2023-08-01 VITALS
BODY MASS INDEX: 22.26 KG/M2 | WEIGHT: 159 LBS | HEART RATE: 89 BPM | TEMPERATURE: 97.5 F | HEIGHT: 71 IN | OXYGEN SATURATION: 98 % | DIASTOLIC BLOOD PRESSURE: 60 MMHG | SYSTOLIC BLOOD PRESSURE: 100 MMHG

## 2023-08-01 DIAGNOSIS — B34.9 VIRAL SYNDROME: Primary | ICD-10-CM

## 2023-08-01 PROCEDURE — 99213 OFFICE O/P EST LOW 20 MIN: CPT | Performed by: INTERNAL MEDICINE

## 2023-09-27 ENCOUNTER — OFFICE VISIT (OUTPATIENT)
Dept: INTERNAL MEDICINE | Age: 68
End: 2023-09-27
Payer: MEDICARE

## 2023-09-27 VITALS
DIASTOLIC BLOOD PRESSURE: 72 MMHG | OXYGEN SATURATION: 96 % | HEIGHT: 71 IN | BODY MASS INDEX: 21.56 KG/M2 | TEMPERATURE: 97.5 F | SYSTOLIC BLOOD PRESSURE: 104 MMHG | WEIGHT: 154 LBS | HEART RATE: 89 BPM

## 2023-09-27 DIAGNOSIS — E78.00 PURE HYPERCHOLESTEROLEMIA: Primary | Chronic | ICD-10-CM

## 2023-09-27 DIAGNOSIS — F51.04 PSYCHOPHYSIOLOGICAL INSOMNIA: Chronic | ICD-10-CM

## 2023-09-27 PROCEDURE — 99214 OFFICE O/P EST MOD 30 MIN: CPT | Performed by: INTERNAL MEDICINE

## 2023-09-27 RX ORDER — ATORVASTATIN CALCIUM 20 MG/1
20 TABLET, FILM COATED ORAL NIGHTLY
Qty: 90 TABLET | Refills: 1 | Status: SHIPPED | OUTPATIENT
Start: 2023-09-27

## 2023-09-27 NOTE — ASSESSMENT & PLAN NOTE
Lab Results   Component Value Date     (H) 04/14/2023     (H) 01/04/2023     (H) 10/05/2020    TRIG 58 04/14/2023    CHOLHDLRATIO 3.18 04/14/2023     Increase atorvastatin to 20 mg daily.   Recheck labs in 3 months.

## 2023-09-27 NOTE — PROGRESS NOTES
"    I N T E R N A L  M E D I C I N E    J U N O H  K I M,  M D      ENCOUNTER DATE:  09/27/2023    Raymundo Mendez / 68 y.o. / male    CHIEF COMPLAINT / REASON FOR OFFICE VISIT     Insomnia and Hyperlipidemia      ASSESSMENT & PLAN     Problem List Items Addressed This Visit          High    Pure hypercholesterolemia - Primary (Chronic)    Current Assessment & Plan     Lab Results   Component Value Date     (H) 04/14/2023     (H) 01/04/2023     (H) 10/05/2020    TRIG 58 04/14/2023    CHOLHDLRATIO 3.18 04/14/2023   Increase atorvastatin to 20 mg daily.   Recheck labs in 3 months.          Relevant Medications    atorvastatin (LIPITOR) 20 MG tablet    Other Relevant Orders    Lipid Panel With / Chol / HDL Ratio    Hepatic Function Panel       Medium    Insomnia (Chronic)    Overview     Continue trazodone 150 mg qHS          Relevant Medications    traZODone (DESYREL) 150 MG tablet     Orders Placed This Encounter   Procedures    Lipid Panel With / Chol / HDL Ratio    Hepatic Function Panel     New Medications Ordered This Visit   Medications    atorvastatin (LIPITOR) 20 MG tablet     Sig: Take 1 tablet by mouth Every Night.     Dispense:  90 tablet     Refill:  1       SUMMARY/DISCUSSION        Next Appointment with me: Visit date not found    Return in about 6 months (around 3/27/2024) for Reassess chronic medical problems.      VITAL SIGNS     Vitals:    09/27/23 1420   BP: 104/72   Pulse: 89   Temp: 97.5 °F (36.4 °C)   SpO2: 96%   Weight: 69.9 kg (154 lb)   Height: 180.3 cm (70.98\")       BP Readings from Last 3 Encounters:   09/27/23 104/72   08/01/23 100/60   07/24/23 118/74     Wt Readings from Last 3 Encounters:   09/27/23 69.9 kg (154 lb)   08/01/23 72.1 kg (159 lb)   07/24/23 70.3 kg (155 lb)     Body mass index is 21.49 kg/m².    Blood pressure readings recorded on patient flowsheet:       No data to display                  MEDICATIONS AT THE TIME OF OFFICE VISIT     Current Outpatient " Medications on File Prior to Visit   Medication Sig    fluticasone (Flonase) 50 MCG/ACT nasal spray 2 sprays into the nostril(s) as directed by provider Daily.    montelukast (SINGULAIR) 10 MG tablet TAKE 1 TABLET BY MOUTH EVERY DAY AT NIGHT    traZODone (DESYREL) 150 MG tablet TAKE 1 TABLET BY MOUTH AT BEDTIME    [DISCONTINUED] atorvastatin (LIPITOR) 10 MG tablet TAKE 1 TABLET BY MOUTH EVERY DAY     No current facility-administered medications on file prior to visit.          HISTORY OF PRESENT ILLNESS     Patient denies any changes or concerns. He is currently taking trazadone 150 mg at bedtime with no complaints. His allergies have been stable with Singulair. Blood pressure and weight are stable. Hyperlipidemia is stable with Lipitor 10 mg daily without side effects. LDL in 04/2023 was 111 mg/dL.      Patient Care Team:  Alin Villegas MD as PCP - General  Waqas Velarde MD as Consulting Physician (Allergy)    REVIEW OF SYSTEMS     Review of Systems       PHYSICAL EXAMINATION     Physical Exam  General: No acute distress  Psych: Normal thought and judgment   Cardiovascular Rate: normal. Rhythm: regular. Heart sounds: normal   Pulm/Chest: Effort normal, breath sounds normal.       REVIEWED DATA     Labs:     Lab Results   Component Value Date     01/04/2023    K 4.5 01/04/2023    CALCIUM 8.9 01/04/2023    AST 11 04/14/2023    ALT 16 04/14/2023    BUN 16 01/04/2023    CREATININE 1.08 01/04/2023    CREATININE 1.12 10/05/2020    CREATININE 0.85 09/28/2019    EGFRIFNONA 66 10/05/2020    EGFRIFAFRI 80 10/05/2020       Lab Results   Component Value Date    HGBA1C 5.10 10/05/2020    HGBA1C 5.90 (H) 09/28/2019    HGBA1C 5.58 07/06/2017       Lab Results   Component Value Date     (H) 04/14/2023     (H) 01/04/2023     (H) 10/05/2020    HDL 56 04/14/2023    HDL 50 01/04/2023    TRIG 58 04/14/2023    TRIG 77 01/04/2023       Lab Results   Component Value Date    TSH 1.080 10/05/2020    TSH 1.250  09/28/2019    TSH 1.490 07/06/2017    FREET4 1.19 10/05/2020    FREET4 1.31 09/28/2019    FREET4 1.19 07/06/2017       Lab Results   Component Value Date    WBC 4.47 01/04/2023    HGB 14.0 01/04/2023     01/04/2023       No results found for: MALBCRERATIO         Imaging:           Medical Tests:           Summary of old records / correspondence / consultant report:           Request outside records:     Transcribed from ambient dictation for Alin Villegas MD by Ya White.  09/27/23   15:46 EDT    Patient or patient representative verbalized consent to the visit recording.  I have personally performed the services described in this document as transcribed by the above individual, and it is both accurate and complete.  Alin Villegas MD  9/27/2023  16:20 EDT

## 2023-11-15 ENCOUNTER — HOSPITAL ENCOUNTER (OUTPATIENT)
Facility: HOSPITAL | Age: 68
Discharge: HOME | End: 2023-11-15
Payer: MEDICARE

## 2023-11-15 ENCOUNTER — OFFICE VISIT (OUTPATIENT)
Dept: INTERNAL MEDICINE | Age: 68
End: 2023-11-15
Payer: MEDICARE

## 2023-11-15 VITALS
WEIGHT: 156 LBS | HEIGHT: 71 IN | OXYGEN SATURATION: 96 % | TEMPERATURE: 97.6 F | HEART RATE: 94 BPM | BODY MASS INDEX: 21.84 KG/M2 | SYSTOLIC BLOOD PRESSURE: 124 MMHG | DIASTOLIC BLOOD PRESSURE: 78 MMHG

## 2023-11-15 DIAGNOSIS — J45.21 MILD INTERMITTENT ASTHMA WITH ACUTE EXACERBATION: ICD-10-CM

## 2023-11-15 DIAGNOSIS — R05.1 ACUTE COUGH: ICD-10-CM

## 2023-11-15 DIAGNOSIS — J06.9 VIRAL URI: Primary | ICD-10-CM

## 2023-11-15 PROCEDURE — 87428 SARSCOV & INF VIR A&B AG IA: CPT

## 2023-11-15 PROCEDURE — 1160F RVW MEDS BY RX/DR IN RCRD: CPT

## 2023-11-15 PROCEDURE — 71046 X-RAY EXAM CHEST 2 VIEWS: CPT

## 2023-11-15 PROCEDURE — 1159F MED LIST DOCD IN RCRD: CPT

## 2023-11-15 PROCEDURE — 99213 OFFICE O/P EST LOW 20 MIN: CPT

## 2023-11-15 RX ORDER — MOMETASONE FUROATE 100 UG/1
100 AEROSOL RESPIRATORY (INHALATION) 2 TIMES DAILY
Qty: 13 G | Refills: 0 | Status: SHIPPED | OUTPATIENT
Start: 2023-11-15

## 2023-11-15 RX ORDER — METHYLPREDNISOLONE 4 MG/1
TABLET ORAL
Qty: 21 TABLET | Refills: 0 | Status: SHIPPED | OUTPATIENT
Start: 2023-11-15

## 2023-11-15 NOTE — PROGRESS NOTES
"    I N T E R N A L  M E D I C I N E  Elena Marsh, JULIO C    ENCOUNTER DATE:  11/15/2023    Raymundo Mendez / 68 y.o. / male      CHIEF COMPLAINT / REASON FOR OFFICE VISIT     Cough      ASSESSMENT & PLAN     Diagnoses and all orders for this visit:    1. Viral URI (Primary)    2. Mild intermittent asthma with acute exacerbation  -     methylPREDNISolone (MEDROL) 4 MG dose pack; Take as directed on package instructions.  Dispense: 21 tablet; Refill: 0  -     Mometasone Furoate (Asmanex HFA) 100 MCG/ACT aerosol; Inhale 100 mcg 2 (Two) Times a Day.  Dispense: 13 g; Refill: 0    3. Acute cough  -     XR Chest 2 View  -     POCT SARS-CoV-2 + Flu Antigen KADEN         SUMMARY/DISCUSSION  Negative for COVID-19 and influenza at today's appointment.  Discussed likely viral nature of his symptoms at this time, contributing to mild asthma exacerbation.  Will treat with medrol dose pack and start daily steroid inhaler.  He was advised to continue albuterol nebulizer treatments at this time.  He does have a current, in date code, albuterol inhaler at home.  Will rule out PNA with Chest XR.  He is aware to visit ER for acutely worsening symptoms.    Recommend Mucinex DM, good hydration with water, rest.        Next Appointment with me: Visit date not found    Return for Next scheduled follow up.      VITAL SIGNS     Visit Vitals  /78   Pulse 94   Temp 97.6 °F (36.4 °C)   Ht 180.3 cm (70.98\")   Wt 70.8 kg (156 lb)   SpO2 96%   BMI 21.77 kg/m²             Wt Readings from Last 3 Encounters:   11/15/23 70.8 kg (156 lb)   09/27/23 69.9 kg (154 lb)   08/01/23 72.1 kg (159 lb)     Body mass index is 21.77 kg/m².        MEDICATIONS AT THE TIME OF OFFICE VISIT     Current Outpatient Medications on File Prior to Visit   Medication Sig Dispense Refill    atorvastatin (LIPITOR) 20 MG tablet Take 1 tablet by mouth Every Night. 90 tablet 1    fluticasone (Flonase) 50 MCG/ACT nasal spray 2 sprays into the nostril(s) as directed by provider " Daily. 11.1 mL 0    montelukast (SINGULAIR) 10 MG tablet TAKE 1 TABLET BY MOUTH EVERY DAY AT NIGHT 30 tablet 5    traZODone (DESYREL) 150 MG tablet TAKE 1 TABLET BY MOUTH AT BEDTIME 30 tablet 5     No current facility-administered medications on file prior to visit.        HISTORY OF PRESENT ILLNESS     Here today for 8-9 day history of fatigue, sinus congestion/ pressure, ear fullness, mild cough.  Some shortness of breath with wheezing symptoms.  Medical history significant for asthma, for which he was formerly followed by Allergy & Asthma specialist Dr. Velarde.  He is using his prescribed nebulizer treatments, and albuterol inhaler PRN at home.  He has not tested himself for COVID-19 at home.  No Ill contacts.  Denies any fever, chills, chest pain.  Eating, drinking, urinating well.  He remains on prescribed montelukast and Flonase nasal spray.   Has not yet received influenza, COVID-19 vaccines this season.    Patient Care Team:  Alin Villegas MD as PCP - General  Waqas Velarde MD as Consulting Physician (Allergy)    REVIEW OF SYSTEMS     Review of Systems   Constitutional:  Positive for fatigue. Negative for chills, fever and unexpected weight change.   HENT:  Positive for congestion and sinus pressure. Negative for ear discharge, ear pain and sinus pain.    Respiratory:  Positive for cough, shortness of breath and wheezing. Negative for chest tightness.    Cardiovascular:  Negative for chest pain, palpitations and leg swelling.   Neurological:  Negative for dizziness, weakness, light-headedness and headaches.   Psychiatric/Behavioral:  The patient is not nervous/anxious.           PHYSICAL EXAMINATION     Physical Exam  Vitals reviewed.   Constitutional:       General: He is not in acute distress.     Appearance: Normal appearance. He is not ill-appearing, toxic-appearing or diaphoretic.   HENT:      Head: Normocephalic and atraumatic.      Right Ear: Tympanic membrane, ear canal and external ear normal. There  is no impacted cerumen.      Left Ear: Tympanic membrane, ear canal and external ear normal. There is no impacted cerumen.      Nose: Nose normal. Congestion present. No rhinorrhea.      Right Sinus: No maxillary sinus tenderness or frontal sinus tenderness.      Left Sinus: No maxillary sinus tenderness or frontal sinus tenderness.      Mouth/Throat:      Mouth: Mucous membranes are moist.      Pharynx: Oropharynx is clear. No oropharyngeal exudate or posterior oropharyngeal erythema.   Cardiovascular:      Rate and Rhythm: Normal rate and regular rhythm.      Heart sounds: Normal heart sounds.   Pulmonary:      Effort: Pulmonary effort is normal.      Breath sounds: Normal breath sounds. Rales (Minor, left lower lobe) present. No wheezing or rhonchi.   Lymphadenopathy:      Cervical: No cervical adenopathy.   Neurological:      Mental Status: He is alert and oriented to person, place, and time. Mental status is at baseline.   Psychiatric:         Mood and Affect: Mood normal.         Behavior: Behavior normal.         Thought Content: Thought content normal.         Judgment: Judgment normal.           REVIEWED DATA     Labs:           Imaging:            Medical Tests:           Summary of old records / correspondence / consultant report:           Request outside records:

## 2023-11-27 DIAGNOSIS — J30.9 ALLERGIC RHINITIS, UNSPECIFIED SEASONALITY, UNSPECIFIED TRIGGER: ICD-10-CM

## 2023-11-28 RX ORDER — MONTELUKAST SODIUM 10 MG/1
TABLET ORAL
Qty: 30 TABLET | Refills: 5 | Status: SHIPPED | OUTPATIENT
Start: 2023-11-28

## 2024-01-03 DIAGNOSIS — G47.00 INSOMNIA, UNSPECIFIED TYPE: ICD-10-CM

## 2024-01-04 RX ORDER — TRAZODONE HYDROCHLORIDE 150 MG/1
TABLET ORAL
Qty: 30 TABLET | Refills: 5 | Status: SHIPPED | OUTPATIENT
Start: 2024-01-04

## 2024-02-10 DIAGNOSIS — G47.00 INSOMNIA, UNSPECIFIED TYPE: ICD-10-CM

## 2024-02-12 RX ORDER — TRAZODONE HYDROCHLORIDE 150 MG/1
TABLET ORAL
Qty: 30 TABLET | Refills: 5 | Status: SHIPPED | OUTPATIENT
Start: 2024-02-12

## 2024-03-27 ENCOUNTER — OFFICE VISIT (OUTPATIENT)
Dept: INTERNAL MEDICINE | Age: 69
End: 2024-03-27
Payer: MEDICARE

## 2024-03-27 VITALS
HEART RATE: 85 BPM | DIASTOLIC BLOOD PRESSURE: 70 MMHG | OXYGEN SATURATION: 99 % | BODY MASS INDEX: 21.98 KG/M2 | WEIGHT: 157 LBS | SYSTOLIC BLOOD PRESSURE: 100 MMHG | TEMPERATURE: 97.3 F | HEIGHT: 71 IN

## 2024-03-27 DIAGNOSIS — J30.89 NON-SEASONAL ALLERGIC RHINITIS, UNSPECIFIED TRIGGER: Chronic | ICD-10-CM

## 2024-03-27 DIAGNOSIS — F51.04 PSYCHOPHYSIOLOGICAL INSOMNIA: Chronic | ICD-10-CM

## 2024-03-27 DIAGNOSIS — E78.00 PURE HYPERCHOLESTEROLEMIA: Primary | Chronic | ICD-10-CM

## 2024-03-27 PROCEDURE — 99214 OFFICE O/P EST MOD 30 MIN: CPT | Performed by: INTERNAL MEDICINE

## 2024-03-27 NOTE — PROGRESS NOTES
"    I N T E R N A L  M E D I C I N E    J U N O H  K I M,  M D      ENCOUNTER DATE:  03/27/2024    Raymundo Mendez / 68 y.o. / male    CHIEF COMPLAINT / REASON FOR OFFICE VISIT     Pure hypercholesterolemia and Insomnia      ASSESSMENT & PLAN     Problem List Items Addressed This Visit          High    Pure hypercholesterolemia - Primary (Chronic)    Overview     Continue atorvastatin 20 mg.          Current Assessment & Plan     Lab Results   Component Value Date     (H) 12/27/2023     (H) 04/14/2023     (H) 01/04/2023    TRIG 80 12/27/2023    CHOLHDLRATIO 3.27 12/27/2023      LDL cholesterol is improved. Continue atorvastatin 20 mg.          Relevant Medications    atorvastatin (LIPITOR) 20 MG tablet       Medium    Insomnia (Chronic)    Overview     Continue trazodone 150 mg qHS          Relevant Medications    traZODone (DESYREL) 150 MG tablet       Low    Allergic rhinitis (Chronic)    Overview     Continue montelukast 10 mg qHS.          Relevant Medications    montelukast (SINGULAIR) 10 MG tablet     No orders of the defined types were placed in this encounter.    No orders of the defined types were placed in this encounter.      SUMMARY/DISCUSSION        Next Appointment with me: Visit date not found    Return in about 6 months (around 9/27/2024) for Reassess chronic medical problems.      VITAL SIGNS     Vitals:    03/27/24 1129   BP: 100/70   Pulse: 85   Temp: 97.3 °F (36.3 °C)   SpO2: 99%   Weight: 71.2 kg (157 lb)   Height: 180.3 cm (70.98\")       BP Readings from Last 3 Encounters:   03/27/24 100/70   11/15/23 124/78   09/27/23 104/72     Wt Readings from Last 3 Encounters:   03/27/24 71.2 kg (157 lb)   11/15/23 70.8 kg (156 lb)   09/27/23 69.9 kg (154 lb)     Body mass index is 21.91 kg/m².    Blood pressure readings recorded on patient flowsheet:       No data to display                  MEDICATIONS AT THE TIME OF OFFICE VISIT     Current Outpatient Medications on File Prior to Visit "   Medication Sig    atorvastatin (LIPITOR) 20 MG tablet Take 1 tablet by mouth Every Night.    fluticasone (Flonase) 50 MCG/ACT nasal spray 2 sprays into the nostril(s) as directed by provider Daily.    Mometasone Furoate (Asmanex HFA) 100 MCG/ACT aerosol Inhale 100 mcg 2 (Two) Times a Day.    montelukast (SINGULAIR) 10 MG tablet TAKE 1 TABLET BY MOUTH EVERY DAY AT NIGHT    traZODone (DESYREL) 150 MG tablet TAKE (1) TABLET BY MOUTH DAILY AT BEDTIME.    [DISCONTINUED] methylPREDNISolone (MEDROL) 4 MG dose pack Take as directed on package instructions.     No current facility-administered medications on file prior to visit.          HISTORY OF PRESENT ILLNESS     LDL cholesterol improved with increase in atorvastatin to 20 mg without problems.   Insomnia stable on trazodone.   Allergic rhinitis stable on montelukast.       Patient Care Team:  Alin Villegas MD as PCP - General  Waqas Velarde MD as Consulting Physician (Allergy)    REVIEW OF SYSTEMS     Review of Systems       PHYSICAL EXAMINATION     Physical Exam  General: No acute distress  Psych: Normal thought and judgment   Cardiovascular Rate: normal. Rhythm: regular. Heart sounds: normal   Pulm/Chest: Effort normal, breath sounds normal.       REVIEWED DATA     Labs:     Lab Results   Component Value Date     01/04/2023    K 4.5 01/04/2023    CALCIUM 8.9 01/04/2023    AST 8 12/27/2023    ALT 11 12/27/2023    BUN 16 01/04/2023    CREATININE 1.08 01/04/2023    CREATININE 1.12 10/05/2020    CREATININE 0.85 09/28/2019    EGFRRESULT 75.7 01/04/2023       Lab Results   Component Value Date    HGBA1C 5.10 10/05/2020    HGBA1C 5.90 (H) 09/28/2019    HGBA1C 5.58 07/06/2017       Lab Results   Component Value Date     (H) 12/27/2023     (H) 04/14/2023     (H) 01/04/2023    HDL 51 12/27/2023    HDL 56 04/14/2023    TRIG 80 12/27/2023    TRIG 58 04/14/2023       Lab Results   Component Value Date    TSH 1.080 10/05/2020    TSH 1.250 09/28/2019  "   TSH 1.490 07/06/2017    FREET4 1.19 10/05/2020    FREET4 1.31 09/28/2019    FREET4 1.19 07/06/2017       Lab Results   Component Value Date    WBC 4.47 01/04/2023    HGB 14.0 01/04/2023     01/04/2023       No results found for: \"MALBCRERATIO\"        Imaging:           Medical Tests:           Summary of old records / correspondence / consultant report:           Request outside records:             "

## 2024-03-27 NOTE — ASSESSMENT & PLAN NOTE
Lab Results   Component Value Date     (H) 12/27/2023     (H) 04/14/2023     (H) 01/04/2023    TRIG 80 12/27/2023    CHOLHDLRATIO 3.27 12/27/2023      LDL cholesterol is improved. Continue atorvastatin 20 mg.

## 2024-04-11 DIAGNOSIS — E78.00 PURE HYPERCHOLESTEROLEMIA: Chronic | ICD-10-CM

## 2024-04-11 DIAGNOSIS — J30.9 ALLERGIC RHINITIS, UNSPECIFIED SEASONALITY, UNSPECIFIED TRIGGER: ICD-10-CM

## 2024-04-12 RX ORDER — ATORVASTATIN CALCIUM 20 MG/1
20 TABLET, FILM COATED ORAL
Qty: 90 TABLET | Refills: 1 | Status: SHIPPED | OUTPATIENT
Start: 2024-04-12

## 2024-04-12 RX ORDER — MONTELUKAST SODIUM 10 MG/1
10 TABLET ORAL EVERY EVENING
Qty: 90 TABLET | Refills: 1 | Status: SHIPPED | OUTPATIENT
Start: 2024-04-12

## 2024-04-17 ENCOUNTER — PATIENT MESSAGE (OUTPATIENT)
Dept: INTERNAL MEDICINE | Age: 69
End: 2024-04-17
Payer: MEDICARE

## 2024-04-17 NOTE — TELEPHONE ENCOUNTER
From: Raymundo Mendez  To: Alin Villegas  Sent: 4/17/2024 8:19 AM EDT  Subject: ED Medication    Dr. Villegas,  I am considering getting 50 mg of Sildenafil from  to help with ED. Based on medications I'm currently taking and my overall health considerations, would you recommend?   Thank you, Klever Mendez

## 2024-06-15 DIAGNOSIS — G47.00 INSOMNIA, UNSPECIFIED TYPE: ICD-10-CM

## 2024-06-18 RX ORDER — TRAZODONE HYDROCHLORIDE 150 MG/1
150 TABLET ORAL
Qty: 30 TABLET | Refills: 5 | Status: SHIPPED | OUTPATIENT
Start: 2024-06-18

## 2024-09-12 DIAGNOSIS — G47.00 INSOMNIA, UNSPECIFIED TYPE: ICD-10-CM

## 2024-09-16 RX ORDER — TRAZODONE HYDROCHLORIDE 150 MG/1
150 TABLET ORAL
Qty: 30 TABLET | Refills: 5 | Status: SHIPPED | OUTPATIENT
Start: 2024-09-16

## 2024-09-25 ENCOUNTER — HOSPITAL ENCOUNTER (OUTPATIENT)
Facility: HOSPITAL | Age: 69
Discharge: HOME | End: 2024-09-25
Admitting: INTERNAL MEDICINE
Payer: MEDICARE

## 2024-09-25 ENCOUNTER — OFFICE VISIT (OUTPATIENT)
Dept: INTERNAL MEDICINE | Age: 69
End: 2024-09-25
Payer: MEDICARE

## 2024-09-25 ENCOUNTER — LAB (OUTPATIENT)
Facility: HOSPITAL | Age: 69
End: 2024-09-25
Payer: MEDICARE

## 2024-09-25 VITALS
BODY MASS INDEX: 22.68 KG/M2 | SYSTOLIC BLOOD PRESSURE: 120 MMHG | HEART RATE: 74 BPM | TEMPERATURE: 97.3 F | HEIGHT: 71 IN | DIASTOLIC BLOOD PRESSURE: 86 MMHG | WEIGHT: 162 LBS | OXYGEN SATURATION: 98 %

## 2024-09-25 DIAGNOSIS — M54.12 CERVICAL RADICULOPATHY: Primary | ICD-10-CM

## 2024-09-25 DIAGNOSIS — F51.04 PSYCHOPHYSIOLOGICAL INSOMNIA: Chronic | ICD-10-CM

## 2024-09-25 DIAGNOSIS — Z12.5 ENCOUNTER FOR SCREENING FOR MALIGNANT NEOPLASM OF PROSTATE: ICD-10-CM

## 2024-09-25 DIAGNOSIS — E78.00 PURE HYPERCHOLESTEROLEMIA: Chronic | ICD-10-CM

## 2024-09-25 LAB
ALBUMIN SERPL-MCNC: 4 G/DL (ref 3.5–5.2)
ALBUMIN/GLOB SERPL: 1.7 G/DL
ALP SERPL-CCNC: 69 U/L (ref 39–117)
ALT SERPL-CCNC: 16 U/L (ref 1–41)
APPEARANCE UR: CLEAR
AST SERPL-CCNC: 17 U/L (ref 1–40)
BILIRUB SERPL-MCNC: 0.5 MG/DL (ref 0–1.2)
BILIRUB UR QL STRIP: NEGATIVE
BUN SERPL-MCNC: 19 MG/DL (ref 8–23)
BUN/CREAT SERPL: 18.8 (ref 7–25)
CALCIUM SERPL-MCNC: 8.9 MG/DL (ref 8.6–10.5)
CHLORIDE SERPL-SCNC: 103 MMOL/L (ref 98–107)
CHOLEST SERPL-MCNC: 164 MG/DL (ref 0–200)
CHOLEST/HDLC SERPL: 3.09 {RATIO}
CO2 SERPL-SCNC: 27.6 MMOL/L (ref 22–29)
COLOR UR: YELLOW
CREAT SERPL-MCNC: 1.01 MG/DL (ref 0.76–1.27)
EGFRCR SERPLBLD CKD-EPI 2021: 81 ML/MIN/1.73
GLOBULIN SER CALC-MCNC: 2.3 GM/DL
GLUCOSE SERPL-MCNC: 80 MG/DL (ref 65–99)
GLUCOSE UR QL STRIP: NEGATIVE
HDLC SERPL-MCNC: 53 MG/DL (ref 40–60)
HGB UR QL STRIP: NEGATIVE
KETONES UR QL STRIP: NEGATIVE
LDLC SERPL CALC-MCNC: 99 MG/DL (ref 0–100)
LEUKOCYTE ESTERASE UR QL STRIP: NEGATIVE
NITRITE UR QL STRIP: NEGATIVE
PH UR STRIP: 7.5 [PH] (ref 5–8)
POTASSIUM SERPL-SCNC: 4.5 MMOL/L (ref 3.5–5.2)
PROT SERPL-MCNC: 6.3 G/DL (ref 6–8.5)
PROT UR QL STRIP: NEGATIVE
PSA SERPL-MCNC: 0.75 NG/ML (ref 0–4)
SODIUM SERPL-SCNC: 139 MMOL/L (ref 136–145)
SP GR UR STRIP: 1.02 (ref 1–1.03)
TRIGL SERPL-MCNC: 60 MG/DL (ref 0–150)
UROBILINOGEN UR STRIP-MCNC: NORMAL MG/DL
VLDLC SERPL CALC-MCNC: 12 MG/DL (ref 5–40)

## 2024-09-25 PROCEDURE — 99214 OFFICE O/P EST MOD 30 MIN: CPT | Performed by: INTERNAL MEDICINE

## 2024-09-25 PROCEDURE — 72050 X-RAY EXAM NECK SPINE 4/5VWS: CPT

## 2024-09-25 PROCEDURE — 1160F RVW MEDS BY RX/DR IN RCRD: CPT | Performed by: INTERNAL MEDICINE

## 2024-09-25 PROCEDURE — G2211 COMPLEX E/M VISIT ADD ON: HCPCS | Performed by: INTERNAL MEDICINE

## 2024-09-25 PROCEDURE — 1126F AMNT PAIN NOTED NONE PRSNT: CPT | Performed by: INTERNAL MEDICINE

## 2024-09-25 PROCEDURE — 1159F MED LIST DOCD IN RCRD: CPT | Performed by: INTERNAL MEDICINE

## 2024-10-03 ENCOUNTER — TELEPHONE (OUTPATIENT)
Dept: INTERNAL MEDICINE | Age: 69
End: 2024-10-03

## 2024-10-03 NOTE — TELEPHONE ENCOUNTER
"  Caller: Raymundo Mendez \"YAZAN\"    Relationship: Self    Best call back number: [phone]     What is the best time to reach you: ANY    Who are you requesting to speak with (clinical staff, provider,  specific staff member):         What was the call regarding: PATIENT IS IN PROCESS OF TRYING TO GET LIFE INSURANCE. PATIENT WANTED TO SEE IF A FAX HAD BEEN RECEIVED FROM [employer]?    PLEASE CALL PATIENT AND ADVISE IF THIS HAS BEEN RECEIVED AND FILLED OUT.  THIS EXPIRES IN A COUPLE OF DAYS AND IS VERY URGENT.      "

## 2024-10-11 DIAGNOSIS — J30.9 ALLERGIC RHINITIS, UNSPECIFIED SEASONALITY, UNSPECIFIED TRIGGER: ICD-10-CM

## 2024-10-11 DIAGNOSIS — E78.00 PURE HYPERCHOLESTEROLEMIA: Chronic | ICD-10-CM

## 2024-10-11 RX ORDER — ATORVASTATIN CALCIUM 20 MG/1
20 TABLET, FILM COATED ORAL
Qty: 90 TABLET | Refills: 1 | Status: SHIPPED | OUTPATIENT
Start: 2024-10-11

## 2024-10-11 RX ORDER — MONTELUKAST SODIUM 10 MG/1
10 TABLET ORAL EVERY EVENING
Qty: 90 TABLET | Refills: 1 | Status: SHIPPED | OUTPATIENT
Start: 2024-10-11

## 2025-03-17 DIAGNOSIS — G47.00 INSOMNIA, UNSPECIFIED TYPE: ICD-10-CM

## 2025-03-17 RX ORDER — TRAZODONE HYDROCHLORIDE 150 MG/1
150 TABLET ORAL
Qty: 30 TABLET | Refills: 5 | Status: CANCELLED | OUTPATIENT
Start: 2025-03-17

## 2025-03-20 ENCOUNTER — TELEPHONE (OUTPATIENT)
Dept: INTERNAL MEDICINE | Age: 70
End: 2025-03-20
Payer: MEDICARE

## 2025-03-21 ENCOUNTER — OFFICE VISIT (OUTPATIENT)
Dept: INTERNAL MEDICINE | Age: 70
End: 2025-03-21
Payer: MEDICARE

## 2025-03-21 VITALS
HEART RATE: 87 BPM | SYSTOLIC BLOOD PRESSURE: 98 MMHG | DIASTOLIC BLOOD PRESSURE: 69 MMHG | WEIGHT: 166 LBS | OXYGEN SATURATION: 98 % | TEMPERATURE: 97.1 F | HEIGHT: 71 IN | BODY MASS INDEX: 23.24 KG/M2

## 2025-03-21 DIAGNOSIS — E78.00 PURE HYPERCHOLESTEROLEMIA: Chronic | ICD-10-CM

## 2025-03-21 DIAGNOSIS — J30.89 NON-SEASONAL ALLERGIC RHINITIS, UNSPECIFIED TRIGGER: Chronic | ICD-10-CM

## 2025-03-21 DIAGNOSIS — G47.00 INSOMNIA, UNSPECIFIED TYPE: Primary | ICD-10-CM

## 2025-03-21 RX ORDER — TRAZODONE HYDROCHLORIDE 150 MG/1
150 TABLET ORAL
Qty: 90 TABLET | Refills: 1 | Status: SHIPPED | OUTPATIENT
Start: 2025-03-21

## 2025-03-21 NOTE — PROGRESS NOTES
"        J  U  N  O  H    K  I  M ,   M  D       I  N  T  E  R  N  A  L    M  E  D  I  C  I  N  E         ENCOUNTER DATE:  03/21/2025    Raymundo Mendez / 69 y.o. / male    OFFICE VISIT ENCOUNTER       CHIEF COMPLAINT / REASON FOR OFFICE VISIT     Insomnia      ASSESSMENT & PLAN     Problem List Items Addressed This Visit          High    Insomnia - Primary (Chronic)    Overview   Continue trazodone 150 mg qHS          Relevant Medications    traZODone (DESYREL) 150 MG tablet       Medium    Pure hypercholesterolemia (Chronic)    Overview   Continue atorvastatin 20 mg.          Relevant Medications    atorvastatin (LIPITOR) 20 MG tablet       Low    Allergic rhinitis (Chronic)    Overview   Continue montelukast 10 mg qHS.          Relevant Medications    montelukast (SINGULAIR) 10 MG tablet     No orders of the defined types were placed in this encounter.    New Medications Ordered This Visit   Medications    traZODone (DESYREL) 150 MG tablet     Sig: Take 1 tablet by mouth every night at bedtime.     Dispense:  90 tablet     Refill:  1       SUMMARY/DISCUSSION  OBTAIN THESE VACCINES AT THE PHARMACY:    RSV, Shingrix (shingles), Hepatitis A Series, and Tdap (Adacel)       TOTAL TIME OF ENCOUNTER:        Next Appointment with me: Visit date not found     Return in about 1 year (around 3/21/2026) for **SCHEDULE COMBINED AWV AND MEDICAL F/U**.      VITAL SIGNS     Vitals:    03/21/25 1401   BP: 98/69   Pulse: 87   Temp: 97.1 °F (36.2 °C)   SpO2: 98%   Weight: 75.3 kg (166 lb)   Height: 180.3 cm (70.98\")       BP Readings from Last 3 Encounters:   03/21/25 98/69   09/25/24 120/86   03/27/24 100/70     Wt Readings from Last 3 Encounters:   03/21/25 75.3 kg (166 lb)   09/25/24 73.5 kg (162 lb)   03/27/24 71.2 kg (157 lb)     Body mass index is 23.17 kg/m².    Blood pressure readings recorded on patient flowsheet:       No data to display                  MEDICATIONS AT THE TIME OF OFFICE VISIT     Current Outpatient " "Medications on File Prior to Visit   Medication Sig    atorvastatin (LIPITOR) 20 MG tablet Take 1 tablet by mouth every night at bedtime.    fluticasone (Flonase) 50 MCG/ACT nasal spray 2 sprays into the nostril(s) as directed by provider Daily.    Mometasone Furoate (Asmanex HFA) 100 MCG/ACT aerosol Inhale 100 mcg 2 (Two) Times a Day.    montelukast (SINGULAIR) 10 MG tablet Take 1 tablet by mouth Every Evening.    [DISCONTINUED] traZODone (DESYREL) 150 MG tablet TAKE 1 TABLET BY MOUTH AT BEDTIME     No current facility-administered medications on file prior to visit.          HISTORY OF PRESENT ILLNESS     Insomnia: stable on trazodone 150 mg qHS. No depression or anxiety.     Hyperlipidemia: atorvastatin 20 mg without problems   Lab Results   Component Value Date    LDL 99 09/25/2024     (H) 12/27/2023     (H) 04/14/2023    TRIG 60 09/25/2024    CHOLHDLRATIO 3.09 09/25/2024      Allergic rhinitis: montelukast, does not use inhaler steroid but has fluticasone nasal spray    Patient Care Team:  Alin Villegas MD as PCP - General  Waqas Velarde MD as Consulting Physician (Allergy)    REVIEW OF SYSTEMS     Review of Systems   Constitutional neg except per HPI   Resp neg  CV neg     PHYSICAL EXAMINATION     Physical Exam  Alert with normal thought and judgment.   Normal affect and mood.   Cardiovascular: Normal rate, regular rhythm.  Pulm/Chest: Effort normal, breath sounds normal.       REVIEWED DATA     Labs:     Lab Results   Component Value Date     09/25/2024    K 4.5 09/25/2024    CALCIUM 8.9 09/25/2024    AST 17 09/25/2024    ALT 16 09/25/2024    BUN 19 09/25/2024    CREATININE 1.01 09/25/2024    CREATININE 1.08 01/04/2023    CREATININE 1.12 10/05/2020     Lab Results   Component Value Date    HGBA1C 5.10 10/05/2020    HGBA1C 5.90 (H) 09/28/2019    HGBA1C 5.58 07/06/2017   No results found for: \"MALBCRERATIO\"  Lab Results   Component Value Date    LDL 99 09/25/2024     (H) 12/27/2023 "     (H) 04/14/2023    HDL 53 09/25/2024    HDL 51 12/27/2023    TRIG 60 09/25/2024    TRIG 80 12/27/2023     Lab Results   Component Value Date    TSH 1.080 10/05/2020    TSH 1.250 09/28/2019    TSH 1.490 07/06/2017    FREET4 1.19 10/05/2020    FREET4 1.31 09/28/2019    FREET4 1.19 07/06/2017     Lab Results   Component Value Date    WBC 4.47 01/04/2023    HGB 14.0 01/04/2023     01/04/2023           Imaging:               Medical Tests:               Summary of old records / correspondence / consultant report:             Request outside records:

## 2025-04-07 DIAGNOSIS — E78.00 PURE HYPERCHOLESTEROLEMIA: Chronic | ICD-10-CM

## 2025-04-07 DIAGNOSIS — J30.9 ALLERGIC RHINITIS, UNSPECIFIED SEASONALITY, UNSPECIFIED TRIGGER: ICD-10-CM

## 2025-04-07 RX ORDER — MONTELUKAST SODIUM 10 MG/1
10 TABLET ORAL EVERY EVENING
Qty: 90 TABLET | Refills: 1 | Status: SHIPPED | OUTPATIENT
Start: 2025-04-07

## 2025-04-07 RX ORDER — ATORVASTATIN CALCIUM 20 MG/1
20 TABLET, FILM COATED ORAL
Qty: 90 TABLET | Refills: 1 | Status: SHIPPED | OUTPATIENT
Start: 2025-04-07